# Patient Record
Sex: MALE | Race: ASIAN | NOT HISPANIC OR LATINO | Employment: OTHER | URBAN - METROPOLITAN AREA
[De-identification: names, ages, dates, MRNs, and addresses within clinical notes are randomized per-mention and may not be internally consistent; named-entity substitution may affect disease eponyms.]

---

## 2017-09-28 ENCOUNTER — APPOINTMENT (OUTPATIENT)
Dept: SOCIAL WORK | Facility: CLINIC | Age: 62
End: 2017-09-28

## 2017-09-28 PROCEDURE — 90686 IIV4 VACC NO PRSV 0.5 ML IM: CPT | Performed by: REGISTERED NURSE

## 2018-09-26 ENCOUNTER — IMMUNIZATION (OUTPATIENT)
Dept: OCCUPATIONAL MEDICINE | Facility: CLINIC | Age: 63
End: 2018-09-26

## 2018-09-26 DIAGNOSIS — Z23 NEED FOR VACCINATION: ICD-10-CM

## 2018-09-30 PROCEDURE — 90686 IIV4 VACC NO PRSV 0.5 ML IM: CPT | Performed by: PREVENTIVE MEDICINE

## 2019-02-28 ENCOUNTER — HOSPITAL ENCOUNTER (OUTPATIENT)
Dept: RADIOLOGY | Facility: MEDICAL CENTER | Age: 64
End: 2019-02-28
Attending: EMERGENCY MEDICINE
Payer: COMMERCIAL

## 2019-02-28 DIAGNOSIS — S83.412A TEAR OF MEDIAL COLLATERAL LIGAMENT OF LEFT KNEE, INITIAL ENCOUNTER: ICD-10-CM

## 2019-02-28 PROCEDURE — 73721 MRI JNT OF LWR EXTRE W/O DYE: CPT | Mod: LT

## 2019-09-23 ENCOUNTER — IMMUNIZATION (OUTPATIENT)
Dept: SOCIAL WORK | Facility: CLINIC | Age: 64
End: 2019-09-23

## 2019-09-23 DIAGNOSIS — Z23 NEED FOR VACCINATION: ICD-10-CM

## 2019-09-23 PROCEDURE — 90686 IIV4 VACC NO PRSV 0.5 ML IM: CPT | Performed by: REGISTERED NURSE

## 2020-03-13 ENCOUNTER — NON-PROVIDER VISIT (OUTPATIENT)
Dept: OCCUPATIONAL MEDICINE | Facility: CLINIC | Age: 65
End: 2020-03-13

## 2020-03-13 DIAGNOSIS — Z02.89 ENCOUNTER FOR OCCUPATIONAL HEALTH EXAMINATION: ICD-10-CM

## 2020-03-13 PROCEDURE — 94375 RESPIRATORY FLOW VOLUME LOOP: CPT | Performed by: FAMILY MEDICINE

## 2020-03-23 ENCOUNTER — EH NON-PROVIDER (OUTPATIENT)
Dept: OCCUPATIONAL MEDICINE | Facility: CLINIC | Age: 65
End: 2020-03-23
Payer: COMMERCIAL

## 2020-03-23 DIAGNOSIS — Z01.89 RESPIRATORY CLEARANCE EXAMINATION, ENCOUNTER FOR: ICD-10-CM

## 2020-07-07 ENCOUNTER — HOSPITAL ENCOUNTER (OUTPATIENT)
Facility: MEDICAL CENTER | Age: 65
End: 2020-07-07
Attending: NURSE PRACTITIONER
Payer: COMMERCIAL

## 2020-07-07 ENCOUNTER — NON-PROVIDER VISIT (OUTPATIENT)
Dept: OCCUPATIONAL MEDICINE | Facility: CLINIC | Age: 65
End: 2020-07-07

## 2020-07-07 DIAGNOSIS — Z11.59 ENCOUNTER FOR SCREENING FOR OTHER VIRAL DISEASES: ICD-10-CM

## 2020-07-07 LAB — COVID ORDER STATUS COVID19: NORMAL

## 2020-07-07 PROCEDURE — U0003 INFECTIOUS AGENT DETECTION BY NUCLEIC ACID (DNA OR RNA); SEVERE ACUTE RESPIRATORY SYNDROME CORONAVIRUS 2 (SARS-COV-2) (CORONAVIRUS DISEASE [COVID-19]), AMPLIFIED PROBE TECHNIQUE, MAKING USE OF HIGH THROUGHPUT TECHNOLOGIES AS DESCRIBED BY CMS-2020-01-R: HCPCS | Performed by: NURSE PRACTITIONER

## 2020-07-08 LAB
SARS-COV-2 RNA RESP QL NAA+PROBE: NOTDETECTED
SPECIMEN SOURCE: NORMAL

## 2020-07-09 ENCOUNTER — TELEPHONE (OUTPATIENT)
Dept: OCCUPATIONAL MEDICINE | Facility: CLINIC | Age: 65
End: 2020-07-09

## 2020-07-09 NOTE — TELEPHONE ENCOUNTER
Phone Number Called: [phone] (home)       Call outcome: Spoke to patient regarding message below.    Message: COVID results Negative and to reach out to his Infection Prevention Contact

## 2020-10-19 ENCOUNTER — NON-PROVIDER VISIT (OUTPATIENT)
Dept: OCCUPATIONAL MEDICINE | Facility: CLINIC | Age: 65
End: 2020-10-19

## 2020-10-19 DIAGNOSIS — Z23 NEED FOR VACCINATION: ICD-10-CM

## 2020-10-19 PROCEDURE — 90686 IIV4 VACC NO PRSV 0.5 ML IM: CPT | Performed by: NURSE PRACTITIONER

## 2020-12-16 DIAGNOSIS — Z23 NEED FOR VACCINATION: ICD-10-CM

## 2020-12-20 ENCOUNTER — APPOINTMENT (OUTPATIENT)
Dept: FAMILY PLANNING/WOMEN'S HEALTH CLINIC | Facility: IMMUNIZATION CENTER | Age: 65
End: 2020-12-20
Attending: FAMILY MEDICINE
Payer: COMMERCIAL

## 2020-12-20 DIAGNOSIS — Z23 NEED FOR VACCINATION: ICD-10-CM

## 2020-12-20 PROCEDURE — 0001A PFIZER SARS-COV-2 VACCINE: CPT

## 2020-12-20 PROCEDURE — 91300 PFIZER SARS-COV-2 VACCINE: CPT

## 2020-12-21 ENCOUNTER — IMMUNIZATION (OUTPATIENT)
Dept: FAMILY PLANNING/WOMEN'S HEALTH CLINIC | Facility: IMMUNIZATION CENTER | Age: 65
End: 2020-12-21
Payer: COMMERCIAL

## 2020-12-21 DIAGNOSIS — Z23 ENCOUNTER FOR VACCINATION: Primary | ICD-10-CM

## 2021-01-07 ENCOUNTER — HOSPITAL ENCOUNTER (OUTPATIENT)
Dept: LAB | Facility: MEDICAL CENTER | Age: 66
End: 2021-01-07
Attending: FAMILY MEDICINE
Payer: COMMERCIAL

## 2021-01-07 LAB
ALBUMIN SERPL BCP-MCNC: 4.2 G/DL (ref 3.2–4.9)
ALBUMIN/GLOB SERPL: 1.3 G/DL
ALP SERPL-CCNC: 115 U/L (ref 30–99)
ALT SERPL-CCNC: 20 U/L (ref 2–50)
ANION GAP SERPL CALC-SCNC: 9 MMOL/L (ref 7–16)
APPEARANCE UR: CLEAR
AST SERPL-CCNC: 15 U/L (ref 12–45)
BACTERIA #/AREA URNS HPF: NEGATIVE /HPF
BASOPHILS # BLD AUTO: 0.6 % (ref 0–1.8)
BASOPHILS # BLD: 0.04 K/UL (ref 0–0.12)
BILIRUB SERPL-MCNC: 0.7 MG/DL (ref 0.1–1.5)
BILIRUB UR QL STRIP.AUTO: NEGATIVE
BUN SERPL-MCNC: 15 MG/DL (ref 8–22)
CALCIUM SERPL-MCNC: 9.3 MG/DL (ref 8.5–10.5)
CHLORIDE SERPL-SCNC: 104 MMOL/L (ref 96–112)
CHOLEST SERPL-MCNC: 220 MG/DL (ref 100–199)
CK SERPL-CCNC: 51 U/L (ref 0–154)
CO2 SERPL-SCNC: 28 MMOL/L (ref 20–33)
COLOR UR: YELLOW
CREAT SERPL-MCNC: 0.9 MG/DL (ref 0.5–1.4)
CRP SERPL HS-MCNC: 1.8 MG/DL (ref 0–0.75)
EOSINOPHIL # BLD AUTO: 0.5 K/UL (ref 0–0.51)
EOSINOPHIL NFR BLD: 7.8 % (ref 0–6.9)
EPI CELLS #/AREA URNS HPF: NEGATIVE /HPF
ERYTHROCYTE [DISTWIDTH] IN BLOOD BY AUTOMATED COUNT: 45.7 FL (ref 35.9–50)
ERYTHROCYTE [SEDIMENTATION RATE] IN BLOOD BY WESTERGREN METHOD: 32 MM/HOUR (ref 0–20)
EST. AVERAGE GLUCOSE BLD GHB EST-MCNC: 140 MG/DL
FASTING STATUS PATIENT QL REPORTED: NORMAL
GLOBULIN SER CALC-MCNC: 3.2 G/DL (ref 1.9–3.5)
GLUCOSE SERPL-MCNC: 120 MG/DL (ref 65–99)
GLUCOSE UR STRIP.AUTO-MCNC: NEGATIVE MG/DL
HBA1C MFR BLD: 6.5 % (ref 0–5.6)
HCT VFR BLD AUTO: 45.2 % (ref 42–52)
HDLC SERPL-MCNC: 63 MG/DL
HGB BLD-MCNC: 14.6 G/DL (ref 14–18)
IMM GRANULOCYTES # BLD AUTO: 0.02 K/UL (ref 0–0.11)
IMM GRANULOCYTES NFR BLD AUTO: 0.3 % (ref 0–0.9)
KETONES UR STRIP.AUTO-MCNC: NEGATIVE MG/DL
LDLC SERPL CALC-MCNC: 138 MG/DL
LEUKOCYTE ESTERASE UR QL STRIP.AUTO: NEGATIVE
LYMPHOCYTES # BLD AUTO: 1.25 K/UL (ref 1–4.8)
LYMPHOCYTES NFR BLD: 19.6 % (ref 22–41)
MCH RBC QN AUTO: 30.8 PG (ref 27–33)
MCHC RBC AUTO-ENTMCNC: 32.3 G/DL (ref 33.7–35.3)
MCV RBC AUTO: 95.4 FL (ref 81.4–97.8)
MICRO URNS: ABNORMAL
MONOCYTES # BLD AUTO: 0.67 K/UL (ref 0–0.85)
MONOCYTES NFR BLD AUTO: 10.5 % (ref 0–13.4)
NEUTROPHILS # BLD AUTO: 3.89 K/UL (ref 1.82–7.42)
NEUTROPHILS NFR BLD: 61.2 % (ref 44–72)
NITRITE UR QL STRIP.AUTO: NEGATIVE
NRBC # BLD AUTO: 0 K/UL
NRBC BLD-RTO: 0 /100 WBC
PH UR STRIP.AUTO: 6.5 [PH] (ref 5–8)
PLATELET # BLD AUTO: 355 K/UL (ref 164–446)
PMV BLD AUTO: 8.6 FL (ref 9–12.9)
POTASSIUM SERPL-SCNC: 3.8 MMOL/L (ref 3.6–5.5)
PROT SERPL-MCNC: 7.4 G/DL (ref 6–8.2)
PROT UR QL STRIP: NEGATIVE MG/DL
PSA SERPL-MCNC: 0.77 NG/ML (ref 0–4)
RBC # BLD AUTO: 4.74 M/UL (ref 4.7–6.1)
RBC # URNS HPF: ABNORMAL /HPF
RBC UR QL AUTO: ABNORMAL
RHEUMATOID FACT SER IA-ACNC: <10 IU/ML (ref 0–14)
SODIUM SERPL-SCNC: 141 MMOL/L (ref 135–145)
SP GR UR STRIP.AUTO: 1.01
TRIGL SERPL-MCNC: 96 MG/DL (ref 0–149)
TSH SERPL DL<=0.005 MIU/L-ACNC: 11.9 UIU/ML (ref 0.38–5.33)
UROBILINOGEN UR STRIP.AUTO-MCNC: 0.2 MG/DL
WBC # BLD AUTO: 6.4 K/UL (ref 4.8–10.8)
WBC #/AREA URNS HPF: ABNORMAL /HPF

## 2021-01-07 PROCEDURE — 36415 COLL VENOUS BLD VENIPUNCTURE: CPT

## 2021-01-07 PROCEDURE — 86060 ANTISTREPTOLYSIN O TITER: CPT

## 2021-01-07 PROCEDURE — 86038 ANTINUCLEAR ANTIBODIES: CPT

## 2021-01-07 PROCEDURE — 85652 RBC SED RATE AUTOMATED: CPT

## 2021-01-07 PROCEDURE — 81001 URINALYSIS AUTO W/SCOPE: CPT

## 2021-01-07 PROCEDURE — 80061 LIPID PANEL: CPT

## 2021-01-07 PROCEDURE — 84443 ASSAY THYROID STIM HORMONE: CPT

## 2021-01-07 PROCEDURE — 86200 CCP ANTIBODY: CPT

## 2021-01-07 PROCEDURE — 82085 ASSAY OF ALDOLASE: CPT

## 2021-01-07 PROCEDURE — 80053 COMPREHEN METABOLIC PANEL: CPT

## 2021-01-07 PROCEDURE — 82550 ASSAY OF CK (CPK): CPT

## 2021-01-07 PROCEDURE — 86431 RHEUMATOID FACTOR QUANT: CPT

## 2021-01-07 PROCEDURE — 85025 COMPLETE CBC W/AUTO DIFF WBC: CPT

## 2021-01-07 PROCEDURE — 84153 ASSAY OF PSA TOTAL: CPT

## 2021-01-07 PROCEDURE — 86140 C-REACTIVE PROTEIN: CPT

## 2021-01-07 PROCEDURE — 86235 NUCLEAR ANTIGEN ANTIBODY: CPT | Mod: 91

## 2021-01-07 PROCEDURE — 83036 HEMOGLOBIN GLYCOSYLATED A1C: CPT

## 2021-01-07 PROCEDURE — 86618 LYME DISEASE ANTIBODY: CPT

## 2021-01-09 LAB
ALDOLASE SERPL-CCNC: 1.6 U/L (ref 1.5–8.1)
ASO AB SERPL-ACNC: <55 IU/ML (ref 0–330)
B BURGDOR AB SER IA-ACNC: 0.21 LIV (ref 0–1.2)
CCP IGG SERPL-ACNC: 4 UNITS (ref 0–19)
ENA SM IGG SER-ACNC: 2 AU/ML (ref 0–40)
ENA SS-B IGG SER IA-ACNC: 0 AU/ML (ref 0–40)
NUCLEAR IGG SER QL IA: NORMAL
SSA52 R0ENA AB IGG Q0420: 2 AU/ML (ref 0–40)
SSA60 R0ENA AB IGG Q0419: 0 AU/ML (ref 0–40)
U1 SNRNP IGG SER QL: 4 AU/ML (ref 0–40)

## 2021-01-11 ENCOUNTER — IMMUNIZATION (OUTPATIENT)
Dept: FAMILY PLANNING/WOMEN'S HEALTH CLINIC | Facility: IMMUNIZATION CENTER | Age: 66
End: 2021-01-11
Attending: FAMILY MEDICINE
Payer: COMMERCIAL

## 2021-01-11 DIAGNOSIS — Z23 ENCOUNTER FOR VACCINATION: Primary | ICD-10-CM

## 2021-01-11 PROCEDURE — 0002A PFIZER SARS-COV-2 VACCINE: CPT

## 2021-01-11 PROCEDURE — 91300 PFIZER SARS-COV-2 VACCINE: CPT

## 2021-04-22 ENCOUNTER — HOSPITAL ENCOUNTER (OUTPATIENT)
Dept: LAB | Facility: MEDICAL CENTER | Age: 66
End: 2021-04-22
Attending: INTERNAL MEDICINE
Payer: COMMERCIAL

## 2021-04-22 ENCOUNTER — HOSPITAL ENCOUNTER (OUTPATIENT)
Dept: LAB | Facility: MEDICAL CENTER | Age: 66
End: 2021-04-22
Attending: FAMILY MEDICINE
Payer: COMMERCIAL

## 2021-04-22 LAB
APPEARANCE UR: CLEAR
BILIRUB UR QL STRIP.AUTO: NEGATIVE
COLOR UR: YELLOW
CRP SERPL HS-MCNC: 1.71 MG/DL (ref 0–0.75)
ERYTHROCYTE [SEDIMENTATION RATE] IN BLOOD BY WESTERGREN METHOD: 13 MM/HOUR (ref 0–20)
EST. AVERAGE GLUCOSE BLD GHB EST-MCNC: 143 MG/DL
GLUCOSE UR STRIP.AUTO-MCNC: NEGATIVE MG/DL
HBA1C MFR BLD: 6.6 % (ref 4–5.6)
KETONES UR STRIP.AUTO-MCNC: NEGATIVE MG/DL
LEUKOCYTE ESTERASE UR QL STRIP.AUTO: NEGATIVE
MICRO URNS: ABNORMAL
NITRITE UR QL STRIP.AUTO: NEGATIVE
PH UR STRIP.AUTO: 6 [PH] (ref 5–8)
PROT UR QL STRIP: NEGATIVE MG/DL
RBC # URNS HPF: ABNORMAL /HPF
RBC UR QL AUTO: ABNORMAL
SP GR UR STRIP.AUTO: 1.02
TSH SERPL DL<=0.005 MIU/L-ACNC: 5.5 UIU/ML (ref 0.38–5.33)
UROBILINOGEN UR STRIP.AUTO-MCNC: 0.2 MG/DL
WBC #/AREA URNS HPF: ABNORMAL /HPF

## 2021-04-22 PROCEDURE — 85652 RBC SED RATE AUTOMATED: CPT | Mod: 91

## 2021-04-22 PROCEDURE — 85652 RBC SED RATE AUTOMATED: CPT

## 2021-04-22 PROCEDURE — 83036 HEMOGLOBIN GLYCOSYLATED A1C: CPT

## 2021-04-22 PROCEDURE — 81001 URINALYSIS AUTO W/SCOPE: CPT

## 2021-04-22 PROCEDURE — 36415 COLL VENOUS BLD VENIPUNCTURE: CPT

## 2021-04-22 PROCEDURE — 84443 ASSAY THYROID STIM HORMONE: CPT

## 2021-04-22 PROCEDURE — 86140 C-REACTIVE PROTEIN: CPT

## 2021-04-23 LAB — ERYTHROCYTE [SEDIMENTATION RATE] IN BLOOD BY WESTERGREN METHOD: 8 MM/HOUR (ref 0–20)

## 2021-05-25 ENCOUNTER — HOSPITAL ENCOUNTER (OUTPATIENT)
Facility: MEDICAL CENTER | Age: 66
End: 2021-05-25
Attending: PHYSICIAN ASSISTANT
Payer: COMMERCIAL

## 2021-05-25 LAB
APPEARANCE UR: CLEAR
BILIRUB UR QL STRIP.AUTO: NEGATIVE
COLOR UR: YELLOW
GLUCOSE UR STRIP.AUTO-MCNC: NEGATIVE MG/DL
KETONES UR STRIP.AUTO-MCNC: NEGATIVE MG/DL
LEUKOCYTE ESTERASE UR QL STRIP.AUTO: NEGATIVE
MICRO URNS: NORMAL
NITRITE UR QL STRIP.AUTO: NEGATIVE
PH UR STRIP.AUTO: 6 [PH] (ref 5–8)
PROT UR QL STRIP: NEGATIVE MG/DL
RBC UR QL AUTO: NEGATIVE
SP GR UR STRIP.AUTO: 1.01
UROBILINOGEN UR STRIP.AUTO-MCNC: 0.2 MG/DL

## 2021-05-25 PROCEDURE — 81003 URINALYSIS AUTO W/O SCOPE: CPT

## 2021-10-01 ENCOUNTER — IMMUNIZATION (OUTPATIENT)
Dept: OCCUPATIONAL MEDICINE | Facility: CLINIC | Age: 66
End: 2021-10-01
Payer: COMMERCIAL

## 2021-10-01 DIAGNOSIS — Z23 NEED FOR VACCINATION: Primary | ICD-10-CM

## 2021-10-01 PROCEDURE — 90686 IIV4 VACC NO PRSV 0.5 ML IM: CPT | Performed by: PREVENTIVE MEDICINE

## 2021-12-10 ENCOUNTER — HOSPITAL ENCOUNTER (OUTPATIENT)
Dept: LAB | Facility: MEDICAL CENTER | Age: 66
End: 2021-12-10
Attending: INTERNAL MEDICINE
Payer: COMMERCIAL

## 2021-12-10 ENCOUNTER — HOSPITAL ENCOUNTER (OUTPATIENT)
Dept: LAB | Facility: MEDICAL CENTER | Age: 66
End: 2021-12-10
Attending: OPHTHALMOLOGY
Payer: COMMERCIAL

## 2021-12-10 LAB
BUN SERPL-MCNC: 23 MG/DL (ref 8–22)
CREAT SERPL-MCNC: 1 MG/DL (ref 0.5–1.4)
CRP SERPL HS-MCNC: <0.3 MG/DL (ref 0–0.75)
ERYTHROCYTE [SEDIMENTATION RATE] IN BLOOD BY WESTERGREN METHOD: 7 MM/HOUR (ref 0–20)

## 2021-12-10 PROCEDURE — 82565 ASSAY OF CREATININE: CPT

## 2021-12-10 PROCEDURE — 84520 ASSAY OF UREA NITROGEN: CPT

## 2021-12-10 PROCEDURE — 36415 COLL VENOUS BLD VENIPUNCTURE: CPT

## 2021-12-10 PROCEDURE — 86140 C-REACTIVE PROTEIN: CPT

## 2021-12-10 PROCEDURE — 85652 RBC SED RATE AUTOMATED: CPT

## 2021-12-14 ENCOUNTER — TELEPHONE (OUTPATIENT)
Dept: HEALTH INFORMATION MANAGEMENT | Facility: OTHER | Age: 66
End: 2021-12-14

## 2021-12-16 ENCOUNTER — HOSPITAL ENCOUNTER (OUTPATIENT)
Dept: RADIOLOGY | Facility: MEDICAL CENTER | Age: 66
End: 2021-12-16
Attending: OPHTHALMOLOGY
Payer: COMMERCIAL

## 2021-12-16 DIAGNOSIS — E05.00 GRAVES' DISEASE: ICD-10-CM

## 2021-12-16 DIAGNOSIS — H53.452 PERIPHERAL VISUAL FIELD DEFECT, LEFT: ICD-10-CM

## 2021-12-16 DIAGNOSIS — H40.021 OPEN ANGLE WITH BORDERLINE FINDINGS AND HIGH GLAUCOMA RISK IN RIGHT EYE: ICD-10-CM

## 2021-12-16 DIAGNOSIS — H05.243 CONSTANT EXOPHTHALMOS OF BOTH EYES: ICD-10-CM

## 2021-12-16 DIAGNOSIS — H40.1223 NORMAL TENSION GLAUCOMA OF LEFT EYE, SEVERE STAGE: ICD-10-CM

## 2021-12-16 PROCEDURE — 70553 MRI BRAIN STEM W/O & W/DYE: CPT

## 2021-12-16 PROCEDURE — 70543 MRI ORBT/FAC/NCK W/O &W/DYE: CPT

## 2021-12-16 PROCEDURE — A9576 INJ PROHANCE MULTIPACK: HCPCS

## 2021-12-16 PROCEDURE — 700117 HCHG RX CONTRAST REV CODE 255

## 2021-12-16 RX ADMIN — GADOTERIDOL 13 ML: 279.3 INJECTION, SOLUTION INTRAVENOUS at 08:56

## 2022-06-14 ENCOUNTER — PATIENT MESSAGE (OUTPATIENT)
Dept: HEALTH INFORMATION MANAGEMENT | Facility: OTHER | Age: 67
End: 2022-06-14

## 2022-06-15 ENCOUNTER — TELEPHONE (OUTPATIENT)
Dept: HEALTH INFORMATION MANAGEMENT | Facility: OTHER | Age: 67
End: 2022-06-15

## 2022-08-02 NOTE — TELEPHONE ENCOUNTER
3rd attempt PCP Provider attribution & 3rd attempt COMPREHENSIVE HEALTH ASSESSMENT.     Please transfer to Member to Outreach Team at [phone] when patient returns call.

## 2022-11-04 ENCOUNTER — DOCUMENTATION (OUTPATIENT)
Dept: HEALTH INFORMATION MANAGEMENT | Facility: OTHER | Age: 67
End: 2022-11-04

## 2023-06-30 ENCOUNTER — HOSPITAL ENCOUNTER (OUTPATIENT)
Dept: LAB | Facility: MEDICAL CENTER | Age: 68
End: 2023-06-30
Attending: FAMILY MEDICINE
Payer: MEDICARE

## 2023-06-30 LAB
ALBUMIN SERPL BCP-MCNC: 4.1 G/DL (ref 3.2–4.9)
ALBUMIN/GLOB SERPL: 1.5 G/DL
ALP SERPL-CCNC: 71 U/L (ref 30–99)
ALT SERPL-CCNC: 13 U/L (ref 2–50)
ANION GAP SERPL CALC-SCNC: 11 MMOL/L (ref 7–16)
APPEARANCE UR: CLEAR
AST SERPL-CCNC: 13 U/L (ref 12–45)
BACTERIA #/AREA URNS HPF: NEGATIVE /HPF
BASOPHILS # BLD AUTO: 1 % (ref 0–1.8)
BASOPHILS # BLD: 0.05 K/UL (ref 0–0.12)
BILIRUB SERPL-MCNC: 0.9 MG/DL (ref 0.1–1.5)
BILIRUB UR QL STRIP.AUTO: NEGATIVE
BUN SERPL-MCNC: 19 MG/DL (ref 8–22)
CALCIUM ALBUM COR SERPL-MCNC: 9.1 MG/DL (ref 8.5–10.5)
CALCIUM SERPL-MCNC: 9.2 MG/DL (ref 8.5–10.5)
CHLORIDE SERPL-SCNC: 106 MMOL/L (ref 96–112)
CHOLEST SERPL-MCNC: 213 MG/DL (ref 100–199)
CK SERPL-CCNC: 103 U/L (ref 0–154)
CO2 SERPL-SCNC: 22 MMOL/L (ref 20–33)
COLOR UR: YELLOW
CREAT SERPL-MCNC: 1.06 MG/DL (ref 0.5–1.4)
CRP SERPL HS-MCNC: <0.3 MG/DL (ref 0–0.75)
EOSINOPHIL # BLD AUTO: 0.39 K/UL (ref 0–0.51)
EOSINOPHIL NFR BLD: 8 % (ref 0–6.9)
EPI CELLS #/AREA URNS HPF: NEGATIVE /HPF
ERYTHROCYTE [DISTWIDTH] IN BLOOD BY AUTOMATED COUNT: 48.5 FL (ref 35.9–50)
ERYTHROCYTE [SEDIMENTATION RATE] IN BLOOD BY WESTERGREN METHOD: 7 MM/HOUR (ref 0–20)
EST. AVERAGE GLUCOSE BLD GHB EST-MCNC: 123 MG/DL
GFR SERPLBLD CREATININE-BSD FMLA CKD-EPI: 77 ML/MIN/1.73 M 2
GLOBULIN SER CALC-MCNC: 2.7 G/DL (ref 1.9–3.5)
GLUCOSE SERPL-MCNC: 91 MG/DL (ref 65–99)
GLUCOSE UR STRIP.AUTO-MCNC: NEGATIVE MG/DL
HBA1C MFR BLD: 5.9 % (ref 4–5.6)
HCT VFR BLD AUTO: 43.7 % (ref 42–52)
HDLC SERPL-MCNC: 82 MG/DL
HGB BLD-MCNC: 14.4 G/DL (ref 14–18)
HYALINE CASTS #/AREA URNS LPF: ABNORMAL /LPF
IMM GRANULOCYTES # BLD AUTO: 0.01 K/UL (ref 0–0.11)
IMM GRANULOCYTES NFR BLD AUTO: 0.2 % (ref 0–0.9)
KETONES UR STRIP.AUTO-MCNC: ABNORMAL MG/DL
LDLC SERPL CALC-MCNC: 115 MG/DL
LEUKOCYTE ESTERASE UR QL STRIP.AUTO: NEGATIVE
LYMPHOCYTES # BLD AUTO: 1.3 K/UL (ref 1–4.8)
LYMPHOCYTES NFR BLD: 26.7 % (ref 22–41)
MCH RBC QN AUTO: 31.2 PG (ref 27–33)
MCHC RBC AUTO-ENTMCNC: 33 G/DL (ref 32.3–36.5)
MCV RBC AUTO: 94.6 FL (ref 81.4–97.8)
MICRO URNS: ABNORMAL
MONOCYTES # BLD AUTO: 0.4 K/UL (ref 0–0.85)
MONOCYTES NFR BLD AUTO: 8.2 % (ref 0–13.4)
NEUTROPHILS # BLD AUTO: 2.71 K/UL (ref 1.82–7.42)
NEUTROPHILS NFR BLD: 55.9 % (ref 44–72)
NITRITE UR QL STRIP.AUTO: NEGATIVE
NRBC # BLD AUTO: 0 K/UL
NRBC BLD-RTO: 0 /100 WBC (ref 0–0.2)
PH UR STRIP.AUTO: 5.5 [PH] (ref 5–8)
PLATELET # BLD AUTO: 270 K/UL (ref 164–446)
PMV BLD AUTO: 8.5 FL (ref 9–12.9)
POTASSIUM SERPL-SCNC: 4.1 MMOL/L (ref 3.6–5.5)
PROT SERPL-MCNC: 6.8 G/DL (ref 6–8.2)
PROT UR QL STRIP: NEGATIVE MG/DL
PSA SERPL-MCNC: 0.66 NG/ML (ref 0–4)
RBC # BLD AUTO: 4.62 M/UL (ref 4.7–6.1)
RBC # URNS HPF: ABNORMAL /HPF
RBC UR QL AUTO: ABNORMAL
SODIUM SERPL-SCNC: 139 MMOL/L (ref 135–145)
SP GR UR STRIP.AUTO: 1.02
TRIGL SERPL-MCNC: 80 MG/DL (ref 0–149)
TSH SERPL DL<=0.005 MIU/L-ACNC: 2.56 UIU/ML (ref 0.38–5.33)
URATE SERPL-MCNC: 7.3 MG/DL (ref 2.5–8.3)
UROBILINOGEN UR STRIP.AUTO-MCNC: 0.2 MG/DL
WBC # BLD AUTO: 4.9 K/UL (ref 4.8–10.8)
WBC #/AREA URNS HPF: ABNORMAL /HPF

## 2023-06-30 PROCEDURE — 84153 ASSAY OF PSA TOTAL: CPT

## 2023-06-30 PROCEDURE — 80061 LIPID PANEL: CPT

## 2023-06-30 PROCEDURE — 86140 C-REACTIVE PROTEIN: CPT

## 2023-06-30 PROCEDURE — 81001 URINALYSIS AUTO W/SCOPE: CPT

## 2023-06-30 PROCEDURE — 83036 HEMOGLOBIN GLYCOSYLATED A1C: CPT

## 2023-06-30 PROCEDURE — 85025 COMPLETE CBC W/AUTO DIFF WBC: CPT

## 2023-06-30 PROCEDURE — 36415 COLL VENOUS BLD VENIPUNCTURE: CPT

## 2023-06-30 PROCEDURE — 82550 ASSAY OF CK (CPK): CPT

## 2023-06-30 PROCEDURE — 84443 ASSAY THYROID STIM HORMONE: CPT

## 2023-06-30 PROCEDURE — 80053 COMPREHEN METABOLIC PANEL: CPT

## 2023-06-30 PROCEDURE — 84550 ASSAY OF BLOOD/URIC ACID: CPT

## 2023-06-30 PROCEDURE — 85652 RBC SED RATE AUTOMATED: CPT

## 2023-07-06 ENCOUNTER — OFFICE VISIT (OUTPATIENT)
Dept: CARDIOLOGY | Facility: MEDICAL CENTER | Age: 68
End: 2023-07-06
Attending: INTERNAL MEDICINE
Payer: MEDICARE

## 2023-07-06 VITALS
BODY MASS INDEX: 21.83 KG/M2 | OXYGEN SATURATION: 98 % | HEIGHT: 65 IN | DIASTOLIC BLOOD PRESSURE: 80 MMHG | RESPIRATION RATE: 14 BRPM | SYSTOLIC BLOOD PRESSURE: 122 MMHG | WEIGHT: 131 LBS | HEART RATE: 80 BPM

## 2023-07-06 DIAGNOSIS — R94.31 ABNORMAL ECG: ICD-10-CM

## 2023-07-06 DIAGNOSIS — R00.2 PALPITATIONS: ICD-10-CM

## 2023-07-06 DIAGNOSIS — I20.9 ANGINA PECTORIS (HCC): ICD-10-CM

## 2023-07-06 DIAGNOSIS — I10 ESSENTIAL HYPERTENSION: ICD-10-CM

## 2023-07-06 DIAGNOSIS — R07.89 OTHER CHEST PAIN: ICD-10-CM

## 2023-07-06 LAB — EKG IMPRESSION: NORMAL

## 2023-07-06 PROCEDURE — 3079F DIAST BP 80-89 MM HG: CPT | Performed by: INTERNAL MEDICINE

## 2023-07-06 PROCEDURE — 3074F SYST BP LT 130 MM HG: CPT | Performed by: INTERNAL MEDICINE

## 2023-07-06 PROCEDURE — 99212 OFFICE O/P EST SF 10 MIN: CPT | Performed by: INTERNAL MEDICINE

## 2023-07-06 PROCEDURE — 93010 ELECTROCARDIOGRAM REPORT: CPT | Performed by: INTERNAL MEDICINE

## 2023-07-06 PROCEDURE — 93005 ELECTROCARDIOGRAM TRACING: CPT | Performed by: INTERNAL MEDICINE

## 2023-07-06 PROCEDURE — 99204 OFFICE O/P NEW MOD 45 MIN: CPT | Mod: 25 | Performed by: INTERNAL MEDICINE

## 2023-07-06 RX ORDER — DORZOLAMIDE HYDROCHLORIDE AND TIMOLOL MALEATE 20; 5 MG/ML; MG/ML
SOLUTION/ DROPS OPHTHALMIC
COMMUNITY
Start: 2023-06-07

## 2023-07-06 RX ORDER — LATANOPROST 50 UG/ML
1 SOLUTION/ DROPS OPHTHALMIC NIGHTLY
COMMUNITY
End: 2024-03-07

## 2023-07-06 ASSESSMENT — ENCOUNTER SYMPTOMS
PALPITATIONS: 0
DYSPNEA ON EXERTION: 0
BLURRED VISION: 0
COUGH: 0
WEAKNESS: 0
FALLS: 0
SHORTNESS OF BREATH: 0
SLEEP DISTURBANCES DUE TO BREATHING: 0
BACK PAIN: 0
DIZZINESS: 0
DIARRHEA: 0
NEAR-SYNCOPE: 0
LIGHT-HEADEDNESS: 0
FEVER: 0
ORTHOPNEA: 0
DECREASED APPETITE: 0
EXCESSIVE DAYTIME SLEEPINESS: 0
DOUBLE VISION: 0
NIGHT SWEATS: 0
WHEEZING: 0
SYNCOPE: 0
PARESTHESIAS: 0
FLANK PAIN: 0
DIAPHORESIS: 0
CONSTIPATION: 0
PND: 0
LOSS OF BALANCE: 0
VOMITING: 0
MYALGIAS: 0
BLOATING: 0
NAUSEA: 0
SORE THROAT: 0
IRREGULAR HEARTBEAT: 0
HEADACHES: 0
NUMBNESS: 0

## 2023-07-06 ASSESSMENT — FIBROSIS 4 INDEX: FIB4 SCORE: 0.88

## 2023-07-06 NOTE — PROGRESS NOTES
Cardiology Initial Consultation Note    Date of note:    7/6/2023    Primary Care Provider: Bobby Kirk M.D.  Referring Provider: Bobby Kirk M.D.     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    Patient states that he was in his usual state of health until few months ago when he started noticing exertional chest pain/pressure, upper back pain and dyspnea.  Symptoms have progressed over the last few months.  Referred to cardiology for further evaluation.  Was skiing in May when he had to stop and rest due to ongoing symptoms.  No prior cardiac issues.    In terms of physical activity, he is active.  Has to stop and rest at times due to exertional symptoms which is new for him.    Cardiovascular Risk Factors:  1. Smoking status: Never smoker  2. Type II Diabetes Mellitus: Prediabetes   Lab Results   Component Value Date/Time    HBA1C 5.9 (H) 06/30/2023 07:26 AM    HBA1C 6.6 (H) 04/22/2021 06:36 AM     3. Hypertension: Yes  4. Dyslipidemia: Yes   Cholesterol,Tot   Date Value Ref Range Status   06/30/2023 213 (H) 100 - 199 mg/dL Final     LDL   Date Value Ref Range Status   06/30/2023 115 (H) <100 mg/dL Final     HDL   Date Value Ref Range Status   06/30/2023 82 >=40 mg/dL Final     Triglycerides   Date Value Ref Range Status   06/30/2023 80 0 - 149 mg/dL Final     5. Family history of early Coronary Artery Disease in a first degree relative (Male less than 55 years of age; Female less than 65 years of age): Denies  6.  Obesity and/or Metabolic Syndrome: No  7. Sedentary lifestyle: No    Review of Systems   Constitutional: Negative for decreased appetite, diaphoresis,  fever, malaise/fatigue and night sweats.   HENT:  Negative for congestion and sore throat.    Eyes:  Negative for blurred vision and double vision.   Cardiovascular:  Positive for chest pain. Negative for cyanosis, dyspnea on exertion, irregular heartbeat, leg swelling, near-syncope, orthopnea, palpitations, paroxysmal nocturnal dyspnea and syncope.   Respiratory:  Negative for cough, shortness of breath, sleep disturbances due to breathing and wheezing.    Endocrine: Negative for cold intolerance and heat intolerance.   Musculoskeletal:  Positive for joint pain. Negative for back pain, falls and myalgias.   Gastrointestinal:  Negative for bloating, constipation, diarrhea, nausea and vomiting.   Genitourinary:  Negative for dysuria and flank pain.   Neurological:  Negative for excessive daytime sleepiness, dizziness, headaches, light-headedness, loss of balance, numbness, paresthesias and weakness.       Past Medical History:   Diagnosis Date    Graves disease     Hypertension          Past Surgical History:   Procedure Laterality Date    OTHER ORTHOPEDIC SURGERY      right shoulder surgery         Current Outpatient Medications   Medication Sig Dispense Refill    dorzolamide-timolol (COSOPT) 22.3-6.8 MG/ML Solution INSTILL 1 DROP INTO LEFT EYE TWICE DAILY      latanoprost (XALATAN) 0.005 % Solution Administer 1 Drop into both eyes every evening.      amlodipine (NORVASC) 5 MG TABS Take 5 mg by mouth every day.      levothyroxine (SYNTHROID) 50 MCG TABS Take 50 mcg by mouth every day.      ondansetron (ZOFRAN ODT) 4 MG TBDP Take 1 Tab by mouth every 8 hours as needed for Nausea/Vomiting. (Patient not taking: Reported on 7/6/2023) 6 Each 0    tamsulosin (FLOMAX) 0.4 MG capsule Take 1 Cap by mouth ONE-HALF HOUR AFTER BREAKFAST. (Patient not taking: Reported on 7/6/2023) 30 Cap 0    ketorolac (TORADOL) 10 MG TABS Take 1 Tab by mouth every four hours as needed for Mild Pain. (Patient not taking: Reported on 7/6/2023) 30  "Tab 3    oxycodone-acetaminophen (PERCOCET) 5-325 MG TABS Take 1-2 Tabs by mouth every four hours as needed (pain). (Patient not taking: Reported on 7/6/2023) 20 Each 0     No current facility-administered medications for this visit.         No Known Allergies      History reviewed. No pertinent family history.      Social History     Socioeconomic History    Marital status: Single     Spouse name: Not on file    Number of children: Not on file    Years of education: Not on file    Highest education level: Not on file   Occupational History    Not on file   Tobacco Use    Smoking status: Never    Smokeless tobacco: Never   Substance and Sexual Activity    Alcohol use: Never    Drug use: Never    Sexual activity: Not on file   Other Topics Concern    Not on file   Social History Narrative    Not on file     Social Determinants of Health     Financial Resource Strain: Not on file   Food Insecurity: Not on file   Transportation Needs: Not on file   Physical Activity: Not on file   Stress: Not on file   Social Connections: Not on file   Intimate Partner Violence: Not on file   Housing Stability: Not on file         Physical Exam:  Ambulatory Vitals  /80 (BP Location: Left arm, Patient Position: Sitting, BP Cuff Size: Adult)   Pulse 80   Resp 14   Ht 1.651 m (5' 5\")   Wt 59.4 kg (131 lb)   SpO2 98%    Oxygen Therapy:  Pulse Oximetry: 98 %  BP Readings from Last 4 Encounters:   07/06/23 122/80   03/04/14 (!) 169/106       Weight/BMI: Body mass index is 21.8 kg/m².  Wt Readings from Last 4 Encounters:   07/06/23 59.4 kg (131 lb)   03/04/14 63.5 kg (140 lb)       General: Well appearing and in no apparent distress  Eyes: nl conjunctiva, no icteric sclera  ENT: normal external appearance of ears  Neck: no visible JVP,  no carotid bruits  Lungs: normal respiratory effort, CTAB  Heart: RRR, no murmurs, no rubs or gallops, no edema bilateral lower extremities.   Abdomen: soft, non tender, non " distended  Extremities/MSK: no clubbing, no cyanosis  Neurological: No focal sensory deficits  Psychiatric: Appropriate affect, A/O x 3, intact judgement and insight  Skin: Warm extremities      Lab Data Review:  Lab Results   Component Value Date/Time    CHOLSTRLTOT 213 (H) 06/30/2023 07:26 AM     (H) 06/30/2023 07:26 AM    HDL 82 06/30/2023 07:26 AM    TRIGLYCERIDE 80 06/30/2023 07:26 AM       Lab Results   Component Value Date/Time    SODIUM 139 06/30/2023 07:26 AM    POTASSIUM 4.1 06/30/2023 07:26 AM    CHLORIDE 106 06/30/2023 07:26 AM    CO2 22 06/30/2023 07:26 AM    GLUCOSE 91 06/30/2023 07:26 AM    BUN 19 06/30/2023 07:26 AM    CREATININE 1.06 06/30/2023 07:26 AM     Lab Results   Component Value Date/Time    ALKPHOSPHAT 71 06/30/2023 07:26 AM    ASTSGOT 13 06/30/2023 07:26 AM    ALTSGPT 13 06/30/2023 07:26 AM    TBILIRUBIN 0.9 06/30/2023 07:26 AM      Lab Results   Component Value Date/Time    WBC 4.9 06/30/2023 07:26 AM     Lab Results   Component Value Date/Time    HBA1C 5.9 (H) 06/30/2023 07:26 AM    HBA1C 6.6 (H) 04/22/2021 06:36 AM         Cardiac Imaging and Procedures Review:    EKG dated 7/6/2023: My personal interpretation is sinus rhythm with inferior T-wave inversions      Radiology test Review:  MRI Brain (12/16/2021):   MRI of the brain without and with contrast within normal limits.      Assessment & Plan     1. Palpitations  EKG      2. Other chest pain  [id number] PET W/FLOW RESERVE      3. Essential hypertension        4. Abnormal ECG  [id number] PET W/FLOW RESERVE      5. Angina pectoris (HCC)  [id number] PET W/FLOW RESERVE            Shared Medical Decision Making:  We discussed his ongoing symptoms in extensive detail.  Symptoms are concerning for angina.  EKG obtained today shows inferior T wave inversions which are new compared to his prior ECG from 2014.  Due to abnormal ECG, not a candidate for exercise treadmill stress test.    Obtain nuclear cardiac PET scan  to evaluate for obstructive CAD.  We discussed proceeding with coronary angiogram and initiate antianginal therapy if stress test is positive to which he is in agreement with.    Discussed doing coronary calcium score for cardiovascular risk assessment which he would like to defer for now.    I have independently interpreted test results and discussed results and management with the patient.    All of patient's excellent questions were answered to the best of my knowledge and to his satisfaction.  It was a pleasure seeing Mr. Yimi Norwood in my clinic today. RTC if abnormal test results otherwise as needed. Patient is aware to call the cardiology clinic with any questions or concerns.      Adam Sanchez MD  [facility]  [facility], [address]  [address] [address]  Phone: [phone]  Fax: [phone]    Please note that this dictation was created using voice recognition software. I have made every reasonable attempt to correct obvious errors, but it is possible there are errors of grammar and possibly content that I did not discover before finalizing the note.

## 2023-07-26 ENCOUNTER — HOSPITAL ENCOUNTER (OUTPATIENT)
Dept: RADIOLOGY | Facility: MEDICAL CENTER | Age: 68
End: 2023-07-26
Attending: INTERNAL MEDICINE
Payer: MEDICARE

## 2023-07-26 DIAGNOSIS — R07.89 OTHER CHEST PAIN: ICD-10-CM

## 2023-07-26 DIAGNOSIS — R94.31 ABNORMAL ECG: ICD-10-CM

## 2023-07-26 DIAGNOSIS — I20.9 ANGINA PECTORIS (HCC): ICD-10-CM

## 2023-07-26 PROCEDURE — 78431 MYOCRD IMG PET RST&STRS CT: CPT

## 2023-07-26 PROCEDURE — 78434 AQMBF PET REST & RX STRESS: CPT | Mod: 26 | Performed by: INTERNAL MEDICINE

## 2023-07-26 PROCEDURE — 78431 MYOCRD IMG PET RST&STRS CT: CPT | Mod: 26 | Performed by: INTERNAL MEDICINE

## 2023-07-27 ENCOUNTER — TELEPHONE (OUTPATIENT)
Dept: CARDIOLOGY | Facility: MEDICAL CENTER | Age: 68
End: 2023-07-27
Payer: MEDICARE

## 2023-07-27 DIAGNOSIS — R94.39 ABNORMAL STRESS TEST: ICD-10-CM

## 2023-07-27 DIAGNOSIS — I10 ESSENTIAL HYPERTENSION: ICD-10-CM

## 2023-07-27 PROCEDURE — 93018 CV STRESS TEST I&R ONLY: CPT | Performed by: INTERNAL MEDICINE

## 2023-07-27 NOTE — TELEPHONE ENCOUNTER
CTA and lab order placed per DA  LRD form completed and faxed to [phone]  Confirmation status received  Scan to [facility]

## 2023-07-27 NOTE — TELEPHONE ENCOUNTER
[facility]            Caller: Yimi Norwood      Topic/issue: The patient was returning our call about the results of his recent stress test and possible blockages that were detected and he was asking for a call back      Callback Number: [phone]      Thank you    -Tom VEGA

## 2023-07-27 NOTE — TELEPHONE ENCOUNTER
Called pt [phone]  No answer, left DETAILED VM stating CTA order has been placed. Please call [phone] for scheduling. BMP lab has been placed per DA. Please complete this lab prior to CTA. Questions? Can call [phone]

## 2023-07-27 NOTE — TELEPHONE ENCOUNTER
Spoke with the patient regarding results.  Can you please order coronary CTA with calcium score?  May need a BMP before CT scan.  Thanks.

## 2023-07-31 DIAGNOSIS — Z01.812 PRE-PROCEDURE LAB EXAM: ICD-10-CM

## 2023-07-31 NOTE — PROGRESS NOTES
Request received to review order/ protocol for coronary CTA. Scan approved. Upon review of available medical records, the following orders have been placed:    Order placed for outpatient, non fasting creatinine blood draw to check kidney function within 30 days prior to contrast administration for coronary CTA if determined medically necessary by CT staff. Instructions for timing of blood test to be given by scheduling/ CT facility team.    This CT study may be scheduled through [facility] Scheduling at , option 2.

## 2023-10-17 ENCOUNTER — DOCUMENTATION (OUTPATIENT)
Dept: HEALTH INFORMATION MANAGEMENT | Facility: OTHER | Age: 68
End: 2023-10-17
Payer: MEDICARE

## 2024-02-21 ENCOUNTER — HOSPITAL ENCOUNTER (OUTPATIENT)
Dept: LAB | Facility: MEDICAL CENTER | Age: 69
End: 2024-02-21
Attending: INTERNAL MEDICINE
Payer: MEDICARE

## 2024-02-21 DIAGNOSIS — I10 ESSENTIAL HYPERTENSION: ICD-10-CM

## 2024-02-21 DIAGNOSIS — R94.39 ABNORMAL STRESS TEST: ICD-10-CM

## 2024-02-21 LAB
ANION GAP SERPL CALC-SCNC: 12 MMOL/L (ref 7–16)
BUN SERPL-MCNC: 15 MG/DL (ref 8–22)
CALCIUM SERPL-MCNC: 9.3 MG/DL (ref 8.5–10.5)
CHLORIDE SERPL-SCNC: 103 MMOL/L (ref 96–112)
CO2 SERPL-SCNC: 23 MMOL/L (ref 20–33)
CREAT SERPL-MCNC: 0.89 MG/DL (ref 0.5–1.4)
GFR SERPLBLD CREATININE-BSD FMLA CKD-EPI: 94 ML/MIN/1.73 M 2
GLUCOSE SERPL-MCNC: 92 MG/DL (ref 65–99)
POTASSIUM SERPL-SCNC: 4.3 MMOL/L (ref 3.6–5.5)
SODIUM SERPL-SCNC: 138 MMOL/L (ref 135–145)

## 2024-02-21 PROCEDURE — 80048 BASIC METABOLIC PNL TOTAL CA: CPT

## 2024-02-21 PROCEDURE — 36415 COLL VENOUS BLD VENIPUNCTURE: CPT

## 2024-03-04 ENCOUNTER — TELEPHONE (OUTPATIENT)
Dept: CARDIOLOGY | Facility: MEDICAL CENTER | Age: 69
End: 2024-03-04
Payer: MEDICARE

## 2024-03-04 ENCOUNTER — HOSPITAL ENCOUNTER (OUTPATIENT)
Dept: RADIOLOGY | Facility: MEDICAL CENTER | Age: 69
End: 2024-03-04
Attending: INTERNAL MEDICINE
Payer: MEDICARE

## 2024-03-04 DIAGNOSIS — R94.39 ABNORMAL STRESS TEST: ICD-10-CM

## 2024-03-04 PROCEDURE — 700102 HCHG RX REV CODE 250 W/ 637 OVERRIDE(OP): Performed by: RADIOLOGY

## 2024-03-04 PROCEDURE — 700117 HCHG RX CONTRAST REV CODE 255: Performed by: INTERNAL MEDICINE

## 2024-03-04 PROCEDURE — A9270 NON-COVERED ITEM OR SERVICE: HCPCS | Performed by: RADIOLOGY

## 2024-03-04 PROCEDURE — 75574 CT ANGIO HRT W/3D IMAGE: CPT

## 2024-03-04 RX ORDER — NITROGLYCERIN 0.4 MG/1
0.4 TABLET SUBLINGUAL
Status: DISCONTINUED | OUTPATIENT
Start: 2024-03-04 | End: 2024-03-05 | Stop reason: HOSPADM

## 2024-03-04 RX ADMIN — NITROGLYCERIN 0.4 MG: 0.4 TABLET, ORALLY DISINTEGRATING SUBLINGUAL at 08:34

## 2024-03-04 RX ADMIN — IOHEXOL 70 ML: 350 INJECTION, SOLUTION INTRAVENOUS at 08:52

## 2024-03-04 NOTE — PROGRESS NOTES
CTA EXAMINATION:  Patient had CTA.  Patient brought to preparation room.   Verbal consent given by patient for PIV and administration of NTG medications by RN.  PIV initiated, 20G R AC    Review of medications, protocol, and NPO status.   Education provided to patient regarding examination.  Patient given baseline 3 lead EK  HR: SR 80  BP: 153/106  RR 18   96% RA     Patient ready for CT scan  Vitals: 0825  BP:188/100  HR: SR 84  RR: 18  96% RA    Administration of Sublingual NTG given per CTA protocol at 0834--See MAR  Vitals: 0834  BP:142/75  HR: SR 80  RR: 18  98% RA    Vitals: 0845  BP:113/68  HR: SR 82  RR: 20  95% RA    Patient returned to preparation area where post procedure education given. PIV removed, patient provided 1 large bottle of water.     Vitals returned to baseline. Patient stated ready to go back. Discharge education provided. Discharged per protocol.     Patient ambulated self, escorted by RN to [facility].

## 2024-03-04 NOTE — TELEPHONE ENCOUNTER
ADD (Dr. Sanchez)    Caller: Paula with Radiology for  Dr. Mone Cole Office/ Speciality: Radiology    Calling about: An order was placed by [facility] and this Dr wants a Cardiologist to read the CTA  (Paula Will be calling every 15 min. Until the Dr speaks with someone)    Callback Number (Personal numbers in routing comments): see comments    Thank You    Qiana CHAN

## 2024-03-04 NOTE — TELEPHONE ENCOUNTER
----- Message from Elsy Leahy M.D. sent at 3/4/2024  3:16 PM PST -----  Can you bring this patient to see me ASAP (this week) due to abnormal CT scan?     ,Elsy Leahy M.D.

## 2024-03-05 NOTE — TELEPHONE ENCOUNTER
ADD (DA)    Caller: Yimi Norwood    Topic/issue: Patient returning RN Evelin's call.    Callback Number: [phone]     Thank you,  Millie OMER

## 2024-03-05 NOTE — TELEPHONE ENCOUNTER
Scheduled appointment for patient to come in Thursday @ 0830 to discuss CT results with TT.    Patient states he would like to establish with JI as his primary.  JI- can patient establish with you as his primary?

## 2024-03-06 NOTE — TELEPHONE ENCOUNTER
Called and spoke with patient.    Scheduled patient for 3/22 at [facility] with [facility].  Advised patient keep his apt with TT to go over CT results.    Patient verbalized understanding.

## 2024-03-06 NOTE — TELEPHONE ENCOUNTER
[place] M.D.  YouYesterday (6:51 AM)       Absolutely. Please see if he could come on Friday 03/22 at 12:40 at [facility], or he may be added on Monday or Tuesday clinic at the end of day if needed. Thank you.  YESICA

## 2024-03-07 ENCOUNTER — OFFICE VISIT (OUTPATIENT)
Dept: CARDIOLOGY | Facility: MEDICAL CENTER | Age: 69
End: 2024-03-07
Attending: INTERNAL MEDICINE
Payer: MEDICARE

## 2024-03-07 VITALS
OXYGEN SATURATION: 99 % | HEART RATE: 78 BPM | BODY MASS INDEX: 22.49 KG/M2 | SYSTOLIC BLOOD PRESSURE: 132 MMHG | DIASTOLIC BLOOD PRESSURE: 80 MMHG | WEIGHT: 135 LBS | RESPIRATION RATE: 16 BRPM | HEIGHT: 65 IN

## 2024-03-07 DIAGNOSIS — E11.65 TYPE 2 DIABETES MELLITUS WITH HYPERGLYCEMIA, WITHOUT LONG-TERM CURRENT USE OF INSULIN (HCC): ICD-10-CM

## 2024-03-07 DIAGNOSIS — I25.10 CORONARY ARTERY DISEASE INVOLVING NATIVE CORONARY ARTERY OF NATIVE HEART WITHOUT ANGINA PECTORIS: ICD-10-CM

## 2024-03-07 DIAGNOSIS — I10 HTN (HYPERTENSION), MALIGNANT: ICD-10-CM

## 2024-03-07 DIAGNOSIS — I20.9 ANGINA PECTORIS, UNSPECIFIED (HCC): ICD-10-CM

## 2024-03-07 DIAGNOSIS — E78.2 MIXED HYPERLIPIDEMIA: ICD-10-CM

## 2024-03-07 PROCEDURE — 3075F SYST BP GE 130 - 139MM HG: CPT | Performed by: INTERNAL MEDICINE

## 2024-03-07 PROCEDURE — RXMED WILLOW AMBULATORY MEDICATION CHARGE: Performed by: INTERNAL MEDICINE

## 2024-03-07 PROCEDURE — 99212 OFFICE O/P EST SF 10 MIN: CPT | Performed by: INTERNAL MEDICINE

## 2024-03-07 PROCEDURE — 3079F DIAST BP 80-89 MM HG: CPT | Performed by: INTERNAL MEDICINE

## 2024-03-07 PROCEDURE — 99214 OFFICE O/P EST MOD 30 MIN: CPT | Performed by: INTERNAL MEDICINE

## 2024-03-07 RX ORDER — ASPIRIN 81 MG/1
81 TABLET ORAL DAILY
Qty: 100 TABLET | Refills: 4 | Status: SHIPPED | OUTPATIENT
Start: 2024-03-07

## 2024-03-07 RX ORDER — ATORVASTATIN CALCIUM 80 MG/1
80 TABLET, FILM COATED ORAL NIGHTLY
Qty: 100 TABLET | Refills: 4 | Status: SHIPPED | OUTPATIENT
Start: 2024-03-07

## 2024-03-07 RX ORDER — NETARSUDIL AND LATANOPROST OPHTHALMIC SOLUTION, 0.02%/0.005% .2; .05 MG/ML; MG/ML
1 SOLUTION/ DROPS OPHTHALMIC; TOPICAL DAILY
COMMUNITY
Start: 2002-01-01

## 2024-03-07 RX ORDER — NEBIVOLOL 10 MG/1
10 TABLET ORAL DAILY
Qty: 100 TABLET | Refills: 4 | Status: SHIPPED | OUTPATIENT
Start: 2024-03-07

## 2024-03-07 RX ORDER — EMPAGLIFLOZIN 10 MG/1
10 TABLET, FILM COATED ORAL DAILY
Qty: 100 TABLET | Refills: 4 | Status: SHIPPED | OUTPATIENT
Start: 2024-03-07

## 2024-03-07 RX ORDER — VALSARTAN 40 MG/1
40 TABLET ORAL DAILY
Qty: 90 TABLET | Refills: 4 | Status: SHIPPED | OUTPATIENT
Start: 2024-03-07

## 2024-03-07 ASSESSMENT — ENCOUNTER SYMPTOMS
COUGH: 0
ABDOMINAL PAIN: 0
BRUISES/BLEEDS EASILY: 0
BLOOD IN STOOL: 0
FALLS: 0
SHORTNESS OF BREATH: 0
LOSS OF CONSCIOUSNESS: 0
HEADACHES: 0
PND: 0
NAUSEA: 0
SPEECH CHANGE: 0
DEPRESSION: 0
EYE PAIN: 0
WEIGHT LOSS: 0
FEVER: 0
PALPITATIONS: 0
SENSORY CHANGE: 0
MYALGIAS: 0
HALLUCINATIONS: 0
BLURRED VISION: 0
CLAUDICATION: 0
ORTHOPNEA: 0
CHILLS: 0
DOUBLE VISION: 0
DIZZINESS: 0
EYE DISCHARGE: 0
VOMITING: 0

## 2024-03-07 ASSESSMENT — FIBROSIS 4 INDEX: FIB4 SCORE: 0.89

## 2024-03-07 NOTE — PROGRESS NOTES
Chief Complaint   Patient presents with    Palpitations       Subjective     Yimi Norwood is a 68 y.o. male who presents today for cardiac care and evaluation due to recent coronary CT angiogram which showed evidence of obstructive disease in right coronary artery.  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  He was also experiencing some back pain and chest pressure sensation with skiing at the time.  However, since then, patient has been feeling much better no symptoms at all.  He has been skiing recently without any issues of chest pain shortness of breath.  He feels well overall.    I did personally interpreted the images of his coronary CT angiogram which show obstructive disease in his RCA with possible subtotal occlusion.  It is a right dominant circulation.  There was no obstructive disease seen in the left main and LAD.    I have personally interpreted EKG today with patient, there is no evidence of acute coronary syndrome, no evidence of prior infarct, normal MS and QT interval, no significant conduction disease. Sinus rhythm.    I have independently interpreted and reviewed blood tests results with patient in clinic which shows LDL level of 115, triglycerides level of 80, GFR of 94, K of 4.3. HgBa1c of 5.9.        Past Medical History:   Diagnosis Date    Graves disease     Hypertension      Past Surgical History:   Procedure Laterality Date    OTHER ORTHOPEDIC SURGERY      right shoulder surgery     History reviewed. No pertinent family history.  Social History     Socioeconomic History    Marital status: Single     Spouse name: Not on file    Number of children: Not on file    Years of education: Not on file    Highest education level: Not on file   Occupational History    Not on file   Tobacco Use    Smoking status: Never    Smokeless tobacco: Never   Substance and Sexual Activity    Alcohol use: Never    Drug use:  Never    Sexual activity: Not on file   Other Topics Concern    Not on file   Social History Narrative    Not on file     Social Determinants of Health     Financial Resource Strain: Not on file   Food Insecurity: Not on file   Transportation Needs: Not on file   Physical Activity: Not on file   Stress: Not on file   Social Connections: Not on file   Intimate Partner Violence: Not on file   Housing Stability: Not on file     No Known Allergies  Outpatient Encounter Medications as of 3/7/2024   Medication Sig Dispense Refill    ROCKLATAN 0.02-0.005 % Solution 1 Drop every day.      nebivolol (BYSTOLIC) 10 MG Tab Take 1 Tablet by mouth every day. 100 Tablet 4    valsartan (DIOVAN) 40 MG Tab Take 1 Tablet by mouth every day. 90 Tablet 4    aspirin 81 MG EC tablet Take 1 Tablet by mouth every day. 100 Tablet 4    Empagliflozin (JARDIANCE) 10 MG Tab tablet Take 1 Tablet by mouth every day. 90 Tablet 4    atorvastatin (LIPITOR) 80 MG tablet Take 1 Tablet by mouth every evening. 100 Tablet 4    dorzolamide-timolol (COSOPT) 22.3-6.8 MG/ML Solution INSTILL 1 DROP INTO LEFT EYE TWICE DAILY      levothyroxine (SYNTHROID) 50 MCG TABS Take 50 mcg by mouth every day.      [DISCONTINUED] latanoprost (XALATAN) 0.005 % Solution Administer 1 Drop into both eyes every evening.      [DISCONTINUED] amlodipine (NORVASC) 5 MG TABS Take 5 mg by mouth every day.      [DISCONTINUED] oxycodone-acetaminophen (PERCOCET) 5-325 MG TABS Take 1-2 Tabs by mouth every four hours as needed (pain). (Patient not taking: Reported on 7/6/2023) 20 Each 0     No facility-administered encounter medications on file as of 3/7/2024.     Review of Systems   Constitutional:  Negative for chills, fever, malaise/fatigue and weight loss.   HENT:  Negative for ear discharge, ear pain, hearing loss and nosebleeds.    Eyes:  Negative for blurred vision, double vision, pain and discharge.   Respiratory:  Negative for cough and shortness of breath.    Cardiovascular:   "Negative for chest pain, palpitations, orthopnea, claudication, leg swelling and PND.   Gastrointestinal:  Negative for abdominal pain, blood in stool, melena, nausea and vomiting.   Genitourinary:  Negative for dysuria and hematuria.   Musculoskeletal:  Negative for falls, joint pain and myalgias.   Skin:  Negative for itching and rash.   Neurological:  Negative for dizziness, sensory change, speech change, loss of consciousness and headaches.   Endo/Heme/Allergies:  Negative for environmental allergies. Does not bruise/bleed easily.   Psychiatric/Behavioral:  Negative for depression, hallucinations and suicidal ideas.               Objective     /80 (BP Location: Left arm, Patient Position: Sitting, BP Cuff Size: Adult)   Pulse 78   Resp 16   Ht 1.651 m (5' 5\")   Wt 61.2 kg (135 lb)   SpO2 99%   BMI 22.47 kg/m²     Physical Exam  Vitals and nursing note reviewed.   Constitutional:       General: He is not in acute distress.     Appearance: He is not diaphoretic.   HENT:      Head: Normocephalic and atraumatic.      Right Ear: External ear normal.      Left Ear: External ear normal.      Nose: No congestion or rhinorrhea.   Eyes:      General:         Right eye: No discharge.         Left eye: No discharge.   Neck:      Thyroid: No thyromegaly.      Vascular: No JVD.   Cardiovascular:      Rate and Rhythm: Normal rate and regular rhythm.      Pulses: Normal pulses.   Pulmonary:      Effort: No respiratory distress.   Abdominal:      General: There is no distension.      Tenderness: There is no abdominal tenderness.   Musculoskeletal:         General: No swelling or tenderness.      Right lower leg: No edema.      Left lower leg: No edema.   Skin:     General: Skin is warm and dry.   Neurological:      Mental Status: He is alert and oriented to person, place, and time.      Cranial Nerves: No cranial nerve deficit.   Psychiatric:         Behavior: Behavior normal.              Assessment & Plan     1. " Coronary artery disease involving native coronary artery of native heart without angina pectoris  nebivolol (BYSTOLIC) 10 MG Tab    valsartan (DIOVAN) 40 MG Tab    EC-ECHOCARDIOGRAM COMPLETE W/O CONT    aspirin 81 MG EC tablet    Empagliflozin (JARDIANCE) 10 MG Tab tablet    Basic Metabolic Panel    LIPID PANEL    Lipoprotein (a)      2. Angina pectoris, unspecified (HCC)  EC-ECHOCARDIOGRAM COMPLETE W/O CONT    Basic Metabolic Panel    LIPID PANEL    Lipoprotein (a)      3. HTN (hypertension), malignant  EC-ECHOCARDIOGRAM COMPLETE W/O CONT      4. Type 2 diabetes mellitus with hyperglycemia, without long-term current use of insulin (HCC)  Empagliflozin (JARDIANCE) 10 MG Tab tablet    Basic Metabolic Panel    LIPID PANEL    Lipoprotein (a)    HEMOGLOBIN A1C      5. Mixed hyperlipidemia  Basic Metabolic Panel    LIPID PANEL    Lipoprotein (a)    atorvastatin (LIPITOR) 80 MG tablet          Medical Decision Making: Today's Assessment/Status/Plan:   At this time, since patient is completely asymptomatic, I did go over the risks and the benefits of an invasive coronary angiogram or cardiac catheterization.  At this time, we will obtain a stat transthoracic echocardiogram to further assess for cardiac function and valvular function.  If reduced LVEF is seen, we will consider further invasive cardiac workup.  If LVEF remains normal without any valvular problems, we will optimize medical therapy.    We will start a atorvastatin 80 mg p.o. once a day with goal of LDL less than 55.    Based on elevated hemoglobin A1c along with evidence of obstructive CAD on CT coronary angiogram, I will put patient on Jardiance 10 mg p.o. once a day based on recent data for cardiac benefits of mortality reduction.    I will stop amlodipine and start patient on Bystolic 10 mg p.o. once a day along with valsartan 40 mg p.o. once a day based on guideline directed medical therapy.      Of note, during the care of this patient, I spent a  significant amount of time explaining the nature of the disease process, reviewing all possible imaging studies, blood test results to patient.  The overall care of this patient require a higher level of care than usual due to the multiple co-morbidities.    Elsy Leahy M.D.

## 2024-03-08 ENCOUNTER — PHARMACY VISIT (OUTPATIENT)
Dept: PHARMACY | Facility: MEDICAL CENTER | Age: 69
End: 2024-03-08
Payer: COMMERCIAL

## 2024-03-11 ENCOUNTER — ANCILLARY PROCEDURE (OUTPATIENT)
Dept: CARDIOLOGY | Facility: MEDICAL CENTER | Age: 69
End: 2024-03-11
Attending: INTERNAL MEDICINE
Payer: MEDICARE

## 2024-03-11 DIAGNOSIS — I25.10 CORONARY ARTERY DISEASE INVOLVING NATIVE CORONARY ARTERY OF NATIVE HEART WITHOUT ANGINA PECTORIS: ICD-10-CM

## 2024-03-11 DIAGNOSIS — I20.9 ANGINA PECTORIS, UNSPECIFIED (HCC): ICD-10-CM

## 2024-03-11 DIAGNOSIS — I10 HTN (HYPERTENSION), MALIGNANT: ICD-10-CM

## 2024-03-11 PROCEDURE — 93306 TTE W/DOPPLER COMPLETE: CPT

## 2024-03-12 LAB
LV EJECT FRACT  99904: 60
LV EJECT FRACT MOD 2C 99903: 60.11
LV EJECT FRACT MOD 4C 99902: 55.82
LV EJECT FRACT MOD BP 99901: 59.23

## 2024-03-12 PROCEDURE — 93306 TTE W/DOPPLER COMPLETE: CPT | Mod: 26 | Performed by: INTERNAL MEDICINE

## 2024-03-14 ENCOUNTER — HOSPITAL ENCOUNTER (OUTPATIENT)
Dept: LAB | Facility: MEDICAL CENTER | Age: 69
End: 2024-03-14
Attending: INTERNAL MEDICINE
Payer: MEDICARE

## 2024-03-14 DIAGNOSIS — I20.9 ANGINA PECTORIS, UNSPECIFIED (HCC): ICD-10-CM

## 2024-03-14 DIAGNOSIS — E11.65 TYPE 2 DIABETES MELLITUS WITH HYPERGLYCEMIA, WITHOUT LONG-TERM CURRENT USE OF INSULIN (HCC): ICD-10-CM

## 2024-03-14 DIAGNOSIS — I25.10 CORONARY ARTERY DISEASE INVOLVING NATIVE CORONARY ARTERY OF NATIVE HEART WITHOUT ANGINA PECTORIS: ICD-10-CM

## 2024-03-14 DIAGNOSIS — E78.2 MIXED HYPERLIPIDEMIA: ICD-10-CM

## 2024-03-14 LAB
ANION GAP SERPL CALC-SCNC: 13 MMOL/L (ref 7–16)
BUN SERPL-MCNC: 22 MG/DL (ref 8–22)
CALCIUM SERPL-MCNC: 9.3 MG/DL (ref 8.5–10.5)
CHLORIDE SERPL-SCNC: 104 MMOL/L (ref 96–112)
CHOLEST SERPL-MCNC: 193 MG/DL (ref 100–199)
CO2 SERPL-SCNC: 23 MMOL/L (ref 20–33)
CREAT SERPL-MCNC: 1.04 MG/DL (ref 0.5–1.4)
GFR SERPLBLD CREATININE-BSD FMLA CKD-EPI: 78 ML/MIN/1.73 M 2
GLUCOSE SERPL-MCNC: 97 MG/DL (ref 65–99)
HDLC SERPL-MCNC: 73 MG/DL
LDLC SERPL CALC-MCNC: 94 MG/DL
POTASSIUM SERPL-SCNC: 4.1 MMOL/L (ref 3.6–5.5)
SODIUM SERPL-SCNC: 140 MMOL/L (ref 135–145)
TRIGL SERPL-MCNC: 132 MG/DL (ref 0–149)

## 2024-03-14 PROCEDURE — 83036 HEMOGLOBIN GLYCOSYLATED A1C: CPT

## 2024-03-14 PROCEDURE — 80061 LIPID PANEL: CPT

## 2024-03-14 PROCEDURE — 80048 BASIC METABOLIC PNL TOTAL CA: CPT

## 2024-03-14 PROCEDURE — 83695 ASSAY OF LIPOPROTEIN(A): CPT

## 2024-03-14 PROCEDURE — 36415 COLL VENOUS BLD VENIPUNCTURE: CPT

## 2024-03-15 LAB
EST. AVERAGE GLUCOSE BLD GHB EST-MCNC: 120 MG/DL
HBA1C MFR BLD: 5.8 % (ref 4–5.6)

## 2024-03-16 LAB — LPA SERPL-MCNC: 19 MG/DL

## 2024-03-22 ENCOUNTER — APPOINTMENT (OUTPATIENT)
Dept: CARDIOLOGY | Facility: MEDICAL CENTER | Age: 69
End: 2024-03-22
Attending: INTERNAL MEDICINE
Payer: MEDICARE

## 2024-03-22 VITALS
DIASTOLIC BLOOD PRESSURE: 80 MMHG | RESPIRATION RATE: 14 BRPM | WEIGHT: 128 LBS | BODY MASS INDEX: 21.33 KG/M2 | HEART RATE: 73 BPM | OXYGEN SATURATION: 99 % | HEIGHT: 65 IN | SYSTOLIC BLOOD PRESSURE: 132 MMHG

## 2024-03-22 DIAGNOSIS — I10 ESSENTIAL HYPERTENSION, BENIGN: ICD-10-CM

## 2024-03-22 DIAGNOSIS — I25.10 CORONARY ARTERY DISEASE INVOLVING NATIVE CORONARY ARTERY OF NATIVE HEART WITHOUT ANGINA PECTORIS: ICD-10-CM

## 2024-03-22 DIAGNOSIS — E78.5 DYSLIPIDEMIA: ICD-10-CM

## 2024-03-22 PROCEDURE — 99212 OFFICE O/P EST SF 10 MIN: CPT | Performed by: INTERNAL MEDICINE

## 2024-03-22 PROCEDURE — 3079F DIAST BP 80-89 MM HG: CPT | Performed by: INTERNAL MEDICINE

## 2024-03-22 PROCEDURE — 99214 OFFICE O/P EST MOD 30 MIN: CPT | Performed by: INTERNAL MEDICINE

## 2024-03-22 PROCEDURE — 3075F SYST BP GE 130 - 139MM HG: CPT | Performed by: INTERNAL MEDICINE

## 2024-03-22 RX ORDER — AMLODIPINE BESYLATE 5 MG/1
5 TABLET ORAL DAILY
COMMUNITY

## 2024-03-22 ASSESSMENT — ENCOUNTER SYMPTOMS
GASTROINTESTINAL NEGATIVE: 1
CHILLS: 0
DIZZINESS: 0
COUGH: 0
MUSCULOSKELETAL NEGATIVE: 1
PSYCHIATRIC NEGATIVE: 1
DEPRESSION: 0
NEUROLOGICAL NEGATIVE: 1
PALPITATIONS: 0
WEIGHT LOSS: 0
MYALGIAS: 0
CLAUDICATION: 0
BRUISES/BLEEDS EASILY: 0
FOCAL WEAKNESS: 0
HEADACHES: 0
FEVER: 0
CARDIOVASCULAR NEGATIVE: 1
RESPIRATORY NEGATIVE: 1
ABDOMINAL PAIN: 0
NAUSEA: 0
NERVOUS/ANXIOUS: 0
CONSTITUTIONAL NEGATIVE: 1
BLURRED VISION: 0
SHORTNESS OF BREATH: 0
EYES NEGATIVE: 1
WEAKNESS: 0
DOUBLE VISION: 0
VOMITING: 0

## 2024-03-22 ASSESSMENT — FIBROSIS 4 INDEX: FIB4 SCORE: 0.89

## 2024-03-22 NOTE — PROGRESS NOTES
Chief Complaint   Patient presents with    Coronary Artery Disease     F/V Dx: Coronary artery disease involving native coronary artery of native heart without angina pectoris    Palpitations       Subjective     Yimi Norwood is a 68 y.o. male who presents today for follow up of coronary artery disease with history of diabetes mellitus, hypertension and hyperlipidemia.    Since the patient's last visit on 03/07/24 with Bobby Singh, he has been doing well clinically. He has had no chest pain since a year ago. He denies shortness of breath, palpitations, nausea/vomiting or diaphoresis. He retired 2 years ago and is active skiing and working on his 2 acres.     Past Medical History:   Diagnosis Date    CAD (coronary artery disease)     Diabetes (HCC)     Graves disease     Hyperlipidemia     Hypertension      Past Surgical History:   Procedure Laterality Date    OTHER ORTHOPEDIC SURGERY      right shoulder surgery     Family History   Problem Relation Age of Onset    Heart Attack Neg Hx     Heart Disease Neg Hx      Social History     Socioeconomic History    Marital status: Single     Spouse name: Not on file    Number of children: Not on file    Years of education: Not on file    Highest education level: Not on file   Occupational History    Not on file   Tobacco Use    Smoking status: Never    Smokeless tobacco: Never   Vaping Use    Vaping Use: Never used   Substance and Sexual Activity    Alcohol use: Never    Drug use: Never    Sexual activity: Not on file   Other Topics Concern    Not on file   Social History Narrative    Not on file     Social Determinants of Health     Financial Resource Strain: Not on file   Food Insecurity: Not on file   Transportation Needs: Not on file   Physical Activity: Not on file   Stress: Not on file   Social Connections: Not on file   Intimate Partner Violence: Not on file   Housing Stability: Not on file     No Known Allergies    (Medications reviewed.)  Outpatient  Encounter Medications as of 3/22/2024   Medication Sig Dispense Refill    amLODIPine (NORVASC) 5 MG Tab Take 5 mg by mouth every day.      ROCKLATAN 0.02-0.005 % Solution 1 Drop every day.      nebivolol (BYSTOLIC) 10 MG Tab Take 1 Tablet by mouth every day. 100 Tablet 4    valsartan (DIOVAN) 40 MG Tab Take 1 Tablet by mouth every day. 90 Tablet 4    aspirin 81 MG EC tablet Take 1 Tablet by mouth every day. 100 Tablet 4    Empagliflozin (JARDIANCE) 10 MG Tab tablet Take 1 Tablet by mouth every day. 100 Tablet 4    atorvastatin (LIPITOR) 80 MG tablet Take 1 Tablet by mouth every evening. (Patient taking differently: Take 20 mg by mouth every evening.) 100 Tablet 4    dorzolamide-timolol (COSOPT) 22.3-6.8 MG/ML Solution INSTILL 1 DROP INTO LEFT EYE TWICE DAILY      levothyroxine (SYNTHROID) 50 MCG TABS Take 88 mcg by mouth every day.       No facility-administered encounter medications on file as of 3/22/2024.     Review of Systems   Constitutional: Negative.  Negative for chills, fever, malaise/fatigue and weight loss.   HENT: Negative.  Negative for hearing loss.    Eyes: Negative.  Negative for blurred vision and double vision.   Respiratory: Negative.  Negative for cough and shortness of breath.    Cardiovascular: Negative.  Negative for chest pain, palpitations, claudication and leg swelling.   Gastrointestinal: Negative.  Negative for abdominal pain, nausea and vomiting.   Genitourinary: Negative.  Negative for dysuria and urgency.   Musculoskeletal: Negative.  Negative for joint pain and myalgias.   Skin: Negative.  Negative for itching and rash.   Neurological: Negative.  Negative for dizziness, focal weakness, weakness and headaches.   Endo/Heme/Allergies: Negative.  Does not bruise/bleed easily.   Psychiatric/Behavioral: Negative.  Negative for depression. The patient is not nervous/anxious.               Objective     /80 (BP Location: Left arm, Patient Position: Sitting, BP Cuff Size: Adult)   Pulse  "73   Resp 14   Ht 1.651 m (5' 5\")   Wt 58.1 kg (128 lb)   SpO2 99%   BMI 21.30 kg/m²     Physical Exam  Constitutional:       Appearance: Normal appearance. He is well-developed and normal weight.   HENT:      Head: Normocephalic and atraumatic.   Neck:      Vascular: No JVD.   Cardiovascular:      Rate and Rhythm: Normal rate and regular rhythm.      Heart sounds: Normal heart sounds.   Pulmonary:      Effort: Pulmonary effort is normal.      Breath sounds: Normal breath sounds.   Abdominal:      General: Bowel sounds are normal.      Palpations: Abdomen is soft.      Comments: No hepatosplenomegaly.   Musculoskeletal:         General: Normal range of motion.   Lymphadenopathy:      Cervical: No cervical adenopathy.   Skin:     General: Skin is warm and dry.   Neurological:      Mental Status: He is alert and oriented to person, place, and time.            CARDIAC STUDIES/PROCEDURES:    CTA OF HEART 3D (03/04/24)   1.  Calcium score of 235.3.  2.  Focal, mixed but predominantly soft plaque in the proximal RCA results in greater than 70% stenosis versus subtotal occlusion. Right dominant circulation. Consider cardiac catheterization.  3.  Mild, predominately calcific plaque in the LAD results in no hemodynamically significant stenosis (less than 50%).  4.  No plaque or stenosis in the LCx.  (study result reviewed)     ECHOCARDIOGRAM CONCLUSIONS (03/11/24)  No prior study is available for comparison.   Normal left ventricular systolic function.   There is basal to mid inferior wall hypokinesis.  No evidence of valvular abnormality based on Doppler evaluation.   Estimated right ventricular systolic pressure is 25 mmHg.  (study result reviewed)     EKG performed on (07/06/23) was reviewed: EKG personally interpreted shows sinus rhythm with inferior ST segment depression andT wave abnormalities.     Laboratory results of (03/14/24) were reviewed. Cholesterol profile of 193/132/73/94 mg/dL noted.     PET SCAN " (07/06/23)  NUCLEAR IMAGING INTERPRETATION  There is decreased perfusion from mid to basal inferoseptal,   inferior and inferolateral wall segments with reversible ischemia.    Extracardiac uptake noted as well.    Artifact versus true ischemia.  Normal left ventricular size, ejection fraction, and wall motion.  Rest EF = 52%  Stress EF = 54%  Upper limit TID = 1.21  Normal total flow reserve = 2.28  ECG INTERPRETATION  Nondiagnostic ECG portion of pharmacological stress study.  (study result reviewed)     Assessment & Plan     1. Coronary artery disease involving native coronary artery of native heart without angina pectoris        2. Essential hypertension, benign        3. Dyslipidemia            Medical Decision Making: Today's Assessment/Status/Plan:        Coronary artery disease: He remains clinically doing well. I will continue with current medical care including aspirin, nebivolol, valsartan, atorvastatin. We did discuss the options of performing cardiac catheterization, however, he does not wish to schedule this procedure at this time.   Hypertension: Blood pressure is well controlled. We will continue with amlodipine, beta blockade therapy and angiotensin receptor blockade.  Hyperlipidemia: He is doing well on statin therapy without myalgia symptoms. We will perform labs including fasting lipid profile.     We will follow up in 3 months.    CC Bobby Wahl

## 2024-05-28 ENCOUNTER — TELEPHONE (OUTPATIENT)
Dept: CARDIOLOGY | Facility: MEDICAL CENTER | Age: 69
End: 2024-05-28
Payer: MEDICARE

## 2024-05-28 PROCEDURE — RXMED WILLOW AMBULATORY MEDICATION CHARGE: Performed by: INTERNAL MEDICINE

## 2024-05-28 NOTE — TELEPHONE ENCOUNTER
Contact:  Phone number:[phone] (home)    Name of person spoken with and relationship to patient: PATIENT   Patient’s Adherence:  How patient is doing on medication: Well    How many missed doses and reason: 0 N/A    Any new medications: No    Any new conditions: No    Any new allergies: No    Any new side effects: No    Any new diagnoses: No    How many doses remaining: 10    Did patient want to speak with pharmacist: No   Delivery:  Delivery date and method: 5/30/24 via USPS    Needs by Date: 6/7/24    Signature required: No     Any additional details for : N/A   Teach Appointment Date:  N/A   Shipping Address:  [address] [place] [address]   Medication(name,strength and dose):  JARDIANCE 10MG TABLET   Copay:  $94   Payment Method:  Credit card on file   Supplies:  N/A   Additional Information:  N/A

## 2024-05-29 ENCOUNTER — PHARMACY VISIT (OUTPATIENT)
Dept: PHARMACY | Facility: MEDICAL CENTER | Age: 69
End: 2024-05-29
Payer: COMMERCIAL

## 2024-06-20 ENCOUNTER — HOSPITAL ENCOUNTER (OUTPATIENT)
Dept: LAB | Facility: MEDICAL CENTER | Age: 69
End: 2024-06-20
Attending: INTERNAL MEDICINE
Payer: MEDICARE

## 2024-06-20 DIAGNOSIS — E78.5 DYSLIPIDEMIA: ICD-10-CM

## 2024-06-20 LAB
ALBUMIN SERPL BCP-MCNC: 4.1 G/DL (ref 3.2–4.9)
ALBUMIN/GLOB SERPL: 1.6 G/DL
ALP SERPL-CCNC: 94 U/L (ref 30–99)
ALT SERPL-CCNC: 31 U/L (ref 2–50)
ANION GAP SERPL CALC-SCNC: 10 MMOL/L (ref 7–16)
AST SERPL-CCNC: 20 U/L (ref 12–45)
BILIRUB SERPL-MCNC: 0.7 MG/DL (ref 0.1–1.5)
BUN SERPL-MCNC: 19 MG/DL (ref 8–22)
CALCIUM ALBUM COR SERPL-MCNC: 9 MG/DL (ref 8.5–10.5)
CALCIUM SERPL-MCNC: 9.1 MG/DL (ref 8.5–10.5)
CHLORIDE SERPL-SCNC: 108 MMOL/L (ref 96–112)
CHOLEST SERPL-MCNC: 143 MG/DL (ref 100–199)
CO2 SERPL-SCNC: 23 MMOL/L (ref 20–33)
CREAT SERPL-MCNC: 0.78 MG/DL (ref 0.5–1.4)
FASTING STATUS PATIENT QL REPORTED: NORMAL
GFR SERPLBLD CREATININE-BSD FMLA CKD-EPI: 97 ML/MIN/1.73 M 2
GLOBULIN SER CALC-MCNC: 2.6 G/DL (ref 1.9–3.5)
GLUCOSE SERPL-MCNC: 98 MG/DL (ref 65–99)
HDLC SERPL-MCNC: 76 MG/DL
LDLC SERPL CALC-MCNC: 55 MG/DL
POTASSIUM SERPL-SCNC: 4.5 MMOL/L (ref 3.6–5.5)
PROT SERPL-MCNC: 6.7 G/DL (ref 6–8.2)
SODIUM SERPL-SCNC: 141 MMOL/L (ref 135–145)
TRIGL SERPL-MCNC: 62 MG/DL (ref 0–149)

## 2024-06-20 PROCEDURE — 36415 COLL VENOUS BLD VENIPUNCTURE: CPT

## 2024-06-20 PROCEDURE — 80061 LIPID PANEL: CPT

## 2024-06-20 PROCEDURE — 80053 COMPREHEN METABOLIC PANEL: CPT

## 2024-07-24 ENCOUNTER — APPOINTMENT (OUTPATIENT)
Dept: CARDIOLOGY | Facility: MEDICAL CENTER | Age: 69
End: 2024-07-24
Attending: INTERNAL MEDICINE
Payer: MEDICARE

## 2024-07-26 ENCOUNTER — TELEPHONE (OUTPATIENT)
Dept: CARDIOLOGY | Facility: MEDICAL CENTER | Age: 69
End: 2024-07-26
Payer: MEDICARE

## 2024-07-26 DIAGNOSIS — I25.10 CORONARY ARTERY DISEASE INVOLVING NATIVE CORONARY ARTERY OF NATIVE HEART WITHOUT ANGINA PECTORIS: ICD-10-CM

## 2024-07-26 RX ORDER — VALSARTAN 40 MG/1
20 TABLET ORAL DAILY
Qty: 100 TABLET | Refills: 1 | Status: SHIPPED | OUTPATIENT
Start: 2024-07-26

## 2024-08-01 ENCOUNTER — APPOINTMENT (OUTPATIENT)
Dept: ADMISSIONS | Facility: MEDICAL CENTER | Age: 69
End: 2024-08-01
Attending: INTERNAL MEDICINE
Payer: MEDICARE

## 2024-08-02 ENCOUNTER — TELEPHONE (OUTPATIENT)
Dept: CARDIOLOGY | Facility: MEDICAL CENTER | Age: 69
End: 2024-08-02
Payer: MEDICARE

## 2024-08-02 NOTE — TELEPHONE ENCOUNTER
----- Message from Lake Cameron M.D. sent at 7/26/2024 12:16 PM PDT -----  Please move up the follow up appointment to discuss further.  Please add the patient on to my Monday or Tuesday clinic at the end of day to discuss in person. Thank you.  CHELA.   ----- Message -----  From: Lake Cameron M.D.  Sent: 7/1/2024   8:37 AM PDT  To: Lake Cameron M.D.    CAD, good lipids

## 2024-08-02 NOTE — TELEPHONE ENCOUNTER
Phone Number Called: [phone]    Call outcome: Left detailed message for patient. Informed to call back with any additional questions.    Message: Called to confirm appt scheduled with JI post cath. Asked pt to call back and also indicated I was sending a MyChart that he could respond to as well.

## 2024-08-06 ENCOUNTER — PATIENT MESSAGE (OUTPATIENT)
Dept: CARDIOLOGY | Facility: MEDICAL CENTER | Age: 69
End: 2024-08-06
Payer: MEDICARE

## 2024-08-08 ENCOUNTER — PRE-ADMISSION TESTING (OUTPATIENT)
Dept: ADMISSIONS | Facility: MEDICAL CENTER | Age: 69
End: 2024-08-08
Attending: INTERNAL MEDICINE
Payer: MEDICARE

## 2024-08-08 RX ORDER — LEVOTHYROXINE SODIUM 88 UG/1
88 TABLET ORAL
COMMUNITY

## 2024-08-08 NOTE — OR NURSING
Pre-Admit RN appointment completed with pt, by this RN, over the phone, for 8/23/24. Discussed pre-procedure instructions. Instructed pt to hold Jardiance AM of procedure. Pt verbalized understanding of all instructions. Pt denies any questions or concerns.

## 2024-08-19 ENCOUNTER — PRE-ADMISSION TESTING (OUTPATIENT)
Dept: ADMISSIONS | Facility: MEDICAL CENTER | Age: 69
End: 2024-08-19
Attending: INTERNAL MEDICINE
Payer: MEDICARE

## 2024-08-19 DIAGNOSIS — Z01.812 PRE-OPERATIVE LABORATORY EXAMINATION: ICD-10-CM

## 2024-08-19 DIAGNOSIS — Z01.810 PRE-OPERATIVE CARDIOVASCULAR EXAMINATION: ICD-10-CM

## 2024-08-19 LAB
ALBUMIN SERPL BCP-MCNC: 4.1 G/DL (ref 3.2–4.9)
ALBUMIN/GLOB SERPL: 1.4 G/DL
ALP SERPL-CCNC: 100 U/L (ref 30–99)
ALT SERPL-CCNC: 28 U/L (ref 2–50)
ANION GAP SERPL CALC-SCNC: 11 MMOL/L (ref 7–16)
APTT PPP: 38.3 SEC (ref 24.7–36)
AST SERPL-CCNC: 33 U/L (ref 12–45)
BILIRUB SERPL-MCNC: 1 MG/DL (ref 0.1–1.5)
BUN SERPL-MCNC: 21 MG/DL (ref 8–22)
CALCIUM ALBUM COR SERPL-MCNC: 9 MG/DL (ref 8.5–10.5)
CALCIUM SERPL-MCNC: 9.1 MG/DL (ref 8.5–10.5)
CHLORIDE SERPL-SCNC: 106 MMOL/L (ref 96–112)
CO2 SERPL-SCNC: 23 MMOL/L (ref 20–33)
CREAT SERPL-MCNC: 0.95 MG/DL (ref 0.5–1.4)
EKG IMPRESSION: NORMAL
ERYTHROCYTE [DISTWIDTH] IN BLOOD BY AUTOMATED COUNT: 49.1 FL (ref 35.9–50)
GFR SERPLBLD CREATININE-BSD FMLA CKD-EPI: 87 ML/MIN/1.73 M 2
GLOBULIN SER CALC-MCNC: 2.9 G/DL (ref 1.9–3.5)
GLUCOSE SERPL-MCNC: 120 MG/DL (ref 65–99)
HCT VFR BLD AUTO: 46.6 % (ref 42–52)
HGB BLD-MCNC: 15 G/DL (ref 14–18)
INR PPP: 0.93 (ref 0.87–1.13)
MCH RBC QN AUTO: 31.1 PG (ref 27–33)
MCHC RBC AUTO-ENTMCNC: 32.2 G/DL (ref 32.3–36.5)
MCV RBC AUTO: 96.5 FL (ref 81.4–97.8)
PLATELET # BLD AUTO: 240 K/UL (ref 164–446)
PMV BLD AUTO: 8.1 FL (ref 9–12.9)
POTASSIUM SERPL-SCNC: 4.5 MMOL/L (ref 3.6–5.5)
PROT SERPL-MCNC: 7 G/DL (ref 6–8.2)
PROTHROMBIN TIME: 12.5 SEC (ref 12–14.6)
RBC # BLD AUTO: 4.83 M/UL (ref 4.7–6.1)
SODIUM SERPL-SCNC: 140 MMOL/L (ref 135–145)
WBC # BLD AUTO: 7.5 K/UL (ref 4.8–10.8)

## 2024-08-19 PROCEDURE — 36415 COLL VENOUS BLD VENIPUNCTURE: CPT

## 2024-08-19 PROCEDURE — 93005 ELECTROCARDIOGRAM TRACING: CPT

## 2024-08-19 PROCEDURE — 85730 THROMBOPLASTIN TIME PARTIAL: CPT

## 2024-08-19 PROCEDURE — 93010 ELECTROCARDIOGRAM REPORT: CPT | Performed by: INTERNAL MEDICINE

## 2024-08-19 PROCEDURE — 80053 COMPREHEN METABOLIC PANEL: CPT

## 2024-08-19 PROCEDURE — 85610 PROTHROMBIN TIME: CPT

## 2024-08-19 PROCEDURE — 85027 COMPLETE CBC AUTOMATED: CPT

## 2024-08-23 ENCOUNTER — APPOINTMENT (OUTPATIENT)
Dept: CARDIOLOGY | Facility: MEDICAL CENTER | Age: 69
End: 2024-08-23
Attending: INTERNAL MEDICINE
Payer: MEDICARE

## 2024-08-23 ENCOUNTER — HOSPITAL ENCOUNTER (OUTPATIENT)
Facility: MEDICAL CENTER | Age: 69
End: 2024-08-23
Attending: INTERNAL MEDICINE | Admitting: INTERNAL MEDICINE
Payer: MEDICARE

## 2024-08-23 VITALS
WEIGHT: 120.59 LBS | OXYGEN SATURATION: 98 % | TEMPERATURE: 97.5 F | HEIGHT: 65 IN | BODY MASS INDEX: 20.09 KG/M2 | SYSTOLIC BLOOD PRESSURE: 122 MMHG | DIASTOLIC BLOOD PRESSURE: 66 MMHG | HEART RATE: 53 BPM | RESPIRATION RATE: 18 BRPM

## 2024-08-23 DIAGNOSIS — I25.10 CORONARY ARTERY DISEASE INVOLVING NATIVE CORONARY ARTERY OF NATIVE HEART WITHOUT ANGINA PECTORIS: ICD-10-CM

## 2024-08-23 DIAGNOSIS — R94.39 POSITIVE CARDIAC STRESS TEST: ICD-10-CM

## 2024-08-23 DIAGNOSIS — I20.9 ANGINA PECTORIS, UNSPECIFIED (HCC): ICD-10-CM

## 2024-08-23 LAB — EKG IMPRESSION: NORMAL

## 2024-08-23 PROCEDURE — 92928 PRQ TCAT PLMT NTRAC ST 1 LES: CPT | Mod: RC | Performed by: INTERNAL MEDICINE

## 2024-08-23 PROCEDURE — 92978 ENDOLUMINL IVUS OCT C 1ST: CPT | Mod: RC

## 2024-08-23 PROCEDURE — 92978 ENDOLUMINL IVUS OCT C 1ST: CPT | Mod: 26,RC | Performed by: INTERNAL MEDICINE

## 2024-08-23 PROCEDURE — 160046 HCHG PACU - 1ST 60 MINS PHASE II

## 2024-08-23 PROCEDURE — 700102 HCHG RX REV CODE 250 W/ 637 OVERRIDE(OP)

## 2024-08-23 PROCEDURE — 160002 HCHG RECOVERY MINUTES (STAT)

## 2024-08-23 PROCEDURE — 99152 MOD SED SAME PHYS/QHP 5/>YRS: CPT | Performed by: INTERNAL MEDICINE

## 2024-08-23 PROCEDURE — A9270 NON-COVERED ITEM OR SERVICE: HCPCS

## 2024-08-23 PROCEDURE — 700117 HCHG RX CONTRAST REV CODE 255: Performed by: INTERNAL MEDICINE

## 2024-08-23 PROCEDURE — 93458 L HRT ARTERY/VENTRICLE ANGIO: CPT | Mod: 26,59 | Performed by: INTERNAL MEDICINE

## 2024-08-23 PROCEDURE — 93005 ELECTROCARDIOGRAM TRACING: CPT | Performed by: INTERNAL MEDICINE

## 2024-08-23 PROCEDURE — 160035 HCHG PACU - 1ST 60 MINS PHASE I

## 2024-08-23 PROCEDURE — 700101 HCHG RX REV CODE 250

## 2024-08-23 PROCEDURE — 160047 HCHG PACU  - EA ADDL 30 MINS PHASE II

## 2024-08-23 PROCEDURE — 700111 HCHG RX REV CODE 636 W/ 250 OVERRIDE (IP): Mod: JZ

## 2024-08-23 RX ORDER — LIDOCAINE HYDROCHLORIDE 20 MG/ML
INJECTION, SOLUTION INFILTRATION; PERINEURAL
Status: COMPLETED
Start: 2024-08-23 | End: 2024-08-23

## 2024-08-23 RX ORDER — SODIUM CHLORIDE 9 MG/ML
INJECTION, SOLUTION INTRAVENOUS CONTINUOUS
Status: DISCONTINUED | OUTPATIENT
Start: 2024-08-23 | End: 2024-08-23 | Stop reason: HOSPADM

## 2024-08-23 RX ORDER — PRASUGREL 10 MG/1
60 TABLET, FILM COATED ORAL ONCE
Status: DISCONTINUED | OUTPATIENT
Start: 2024-08-23 | End: 2024-08-23

## 2024-08-23 RX ORDER — PRASUGREL 10 MG/1
10 TABLET, FILM COATED ORAL DAILY
Qty: 90 TABLET | Refills: 1 | Status: SHIPPED | OUTPATIENT
Start: 2024-08-23

## 2024-08-23 RX ORDER — ACETAMINOPHEN 325 MG/1
650 TABLET ORAL EVERY 6 HOURS PRN
Status: DISCONTINUED | OUTPATIENT
Start: 2024-08-23 | End: 2024-08-23 | Stop reason: HOSPADM

## 2024-08-23 RX ORDER — PRASUGREL 10 MG/1
10 TABLET, FILM COATED ORAL DAILY
Status: DISCONTINUED | OUTPATIENT
Start: 2024-08-24 | End: 2024-08-23 | Stop reason: HOSPADM

## 2024-08-23 RX ORDER — PRASUGREL 10 MG/1
TABLET, FILM COATED ORAL
Status: COMPLETED
Start: 2024-08-23 | End: 2024-08-23

## 2024-08-23 RX ORDER — VERAPAMIL HYDROCHLORIDE 2.5 MG/ML
INJECTION, SOLUTION INTRAVENOUS
Status: COMPLETED
Start: 2024-08-23 | End: 2024-08-23

## 2024-08-23 RX ORDER — MIDAZOLAM HYDROCHLORIDE 1 MG/ML
INJECTION INTRAMUSCULAR; INTRAVENOUS
Status: COMPLETED
Start: 2024-08-23 | End: 2024-08-23

## 2024-08-23 RX ORDER — BIVALIRUDIN 250 MG/5ML
INJECTION, POWDER, LYOPHILIZED, FOR SOLUTION INTRAVENOUS
Status: COMPLETED
Start: 2024-08-23 | End: 2024-08-23

## 2024-08-23 RX ORDER — HEPARIN SODIUM 1000 [USP'U]/ML
INJECTION, SOLUTION INTRAVENOUS; SUBCUTANEOUS
Status: COMPLETED
Start: 2024-08-23 | End: 2024-08-23

## 2024-08-23 RX ORDER — HEPARIN SODIUM 200 [USP'U]/100ML
INJECTION, SOLUTION INTRAVENOUS
Status: COMPLETED
Start: 2024-08-23 | End: 2024-08-23

## 2024-08-23 RX ORDER — ASPIRIN 81 MG/1
81 TABLET ORAL DAILY
Status: DISCONTINUED | OUTPATIENT
Start: 2024-08-24 | End: 2024-08-23 | Stop reason: HOSPADM

## 2024-08-23 RX ADMIN — VERAPAMIL HYDROCHLORIDE 5 MG: 2.5 INJECTION, SOLUTION INTRAVENOUS at 11:31

## 2024-08-23 RX ADMIN — NITROGLYCERIN 10 ML: 20 INJECTION INTRAVENOUS at 11:31

## 2024-08-23 RX ADMIN — BIVALIRUDIN 250 MG: 250 INJECTION, POWDER, LYOPHILIZED, FOR SOLUTION INTRAVENOUS at 12:33

## 2024-08-23 RX ADMIN — MIDAZOLAM HYDROCHLORIDE 1.5 MG: 1 INJECTION, SOLUTION INTRAMUSCULAR; INTRAVENOUS at 12:33

## 2024-08-23 RX ADMIN — HEPARIN SODIUM: 1000 INJECTION, SOLUTION INTRAVENOUS; SUBCUTANEOUS at 11:31

## 2024-08-23 RX ADMIN — FENTANYL CITRATE 50 MCG: 50 INJECTION, SOLUTION INTRAMUSCULAR; INTRAVENOUS at 12:34

## 2024-08-23 RX ADMIN — PRASUGREL 60 MG: 10 TABLET, FILM COATED ORAL at 12:36

## 2024-08-23 RX ADMIN — LIDOCAINE HYDROCHLORIDE: 20 INJECTION, SOLUTION INFILTRATION; PERINEURAL at 11:31

## 2024-08-23 RX ADMIN — HEPARIN SODIUM 2000 UNITS: 200 INJECTION, SOLUTION INTRAVENOUS at 11:31

## 2024-08-23 RX ADMIN — IOHEXOL 100 ML: 350 INJECTION, SOLUTION INTRAVENOUS at 12:33

## 2024-08-23 ASSESSMENT — ENCOUNTER SYMPTOMS
SHORTNESS OF BREATH: 0
DIZZINESS: 0
ORTHOPNEA: 0
PALPITATIONS: 0
LOSS OF CONSCIOUSNESS: 0

## 2024-08-23 ASSESSMENT — FIBROSIS 4 INDEX: FIB4 SCORE: 1.77

## 2024-08-23 NOTE — OR NURSING
1250 patient arrived from cath lab, report received, attached to monitoring. VSS, patient oxygenating well on room air, s/p heart cath with right radial access, TR band in place with 13 cc in balloon, angiomax gtt on for 2 hrs, patient denies pain and nausea, plan of care discussed, questions answered     1330 patient drinking water, tolerates well     1450 small amount of blood noted on TR band, cleaned with gauze,     1422 1 cc of air removed from TR band, continues soft, dry, intact    1434 Angiomax gtt stopped    1437 1 cc of air removed from TR band, continues soft, dry, intact     1452 1 cc of air removed from TR band, continues soft, dry, intact    1445 telephone updates to friend, Sudheer    1500 APRN at bedside to assess patient, per APRN patient okay to dc home     1507 1 cc of air removed from TR band, continues soft, dry, intact    1522 2 cc of air removed from TR band, continues soft, dry, intact    1537 2 cc of air removed from TR band, continues soft, dry, intact    1552 3 cc of air removed from TR band, continues soft, dry, intact    1607 2 cc of air removed from TR band, continues soft, dry, intact    1615 dc instructions discussed with patient and patient's friend Sudheer, questions answered, verbalized understanding, patient getting dressed piv dc     1618 patient dc home in stable condition, vss, right radial access clean/dry/intact/soft with gauze and tegaderm in place, all belongings with patient and accounted for, escorted out with this RN.

## 2024-08-23 NOTE — DISCHARGE INSTRUCTIONS
POST ANGIOGRAM  General Care Instructions  Maintain a bandage over the incision site for 24 hours.  It's normal to find a small bruise or dime-sized lump at the insertion site. This should disappear within a few weeks.  Do not apply lotions or powders to the site.  Do not immerse the catheter insertion site in water (bathtub/swimming) for five days. It is ok to shower 24 hours after the procedure.  You may resume your normal diet immediately; on the day of your procedure, drink 6-10 glasses of water to help flush the contrast liquid out of your system.  If the doctor inserted the catheter in through your groin:  Walking short distances on a flat surface is OK. Limit going up/down stairs for the first 2 days.  DO NOT do yard work, drive, squat, lift heavy objects, or play sports for 2 days; or until your health care provider tells you it is OK.  If the doctor inserted the catheter in your arm:  For 3 days, DO NOT lift anything heavier than 10 pounds (approximately a gallon of milk). DO NOT do any heavy pushing, pulling, or twisting.    Medications  If your current medications need to be changed, you will be provided with an updated list of your medications prior to discharge.  If you take warfarin (Coumadin), resume taking your usual dose the evening after the procedure.  DO NOT STOP taking prescribed blood thinning (anti-platelet) medications unless instructed by your cardiologist.  These medications include:  Aspirin, Clopidogrel (Plavix), Ticagrelor (Brilinta), or Prasugrel (Effient)   If you take one of the following anticoagulants, RESUME 24 HOURS after your procedure:  Apixiban (Eliquis), Rivaroxaban (Xarelto), Dabigatran (Pradaxa), Edoxaban (Savaysa)  If you take metformin (Glucophage), RESUME 48 HOURS after your procedure.    When to call your healthcare provider  Call your cardiologist right away at [phone] if you have any of the following:   Problems/Concerns taking any of your prescribed heart  medicines.   The insertion site has increasing pain, swelling, redness, bleeding, or drainage.   Your arm or leg below where the insertion site changes color, is cool, or is numb.   You have chest pain or shortness of breath that does not go away with rest.   Your pulse feels irregular -- very slow (less than 60 beats/minute) or very fast (over 100 beats/minute).   You have dizziness, fainting, or you are very tired.   You are coughing up blood or yellow or green mucus.   You have chills or a fever over 101°F (38.3°C).    If there is bleeding at the catheter insertion site, apply pressure for 10 minutes.  If bleeding persists, call 911, and continue to hold pressure until advanced medical support arrives.        Exercising Safely After Percutaneous Coronary Intervention (PCI)  After percutaneous coronary intervention (PCI), which involves angioplasty and often stenting, it's important to focus on your heart health. Exercise can help strengthen your heart. It can also help you feel good and improve your overall health. Talk with your health care provider or cardiac rehab team member about good options for you.  Start slowly. Work up to more vigorous exercise as you get stronger. Aim for at least 150 minutes of exercise each week.  Include aerobic activities. These make the heart beat faster. They work the heart and lungs, and improve the body's ability to use oxygen. Good choices include walking, swimming, and biking .  Always follow your doctor's recommendation for exercise.   You have been referred to cardiac rehabilitation, which is important for your recovery.  You may contact [facility] at [phone] to learn more and schedule a visit.        Lifestyle Management After Percutaneous Coronary Intervention (PCI)  Percutaneous coronary intervention (PCI)  involves angioplasty and often stenting. This procedure can open arteries and relieve symptoms. But, it doesn't cure coronary artery  disease. New blockages can still form. You need to take steps to prevent this by managing risk factors. Doing so will help make your heart and arteries healthier. Your doctor may prescribe cardiac rehabilitation to help with this lifelong process.  Understanding risk factors  Some risk factors for coronary artery disease can be controlled. These include smoking, high blood pressure, cholesterol, diabetes, and obesity. They can be managed with medication, diet, and exercise. Support and counseling can also play a role. The effort will pay off! Managing risk factors can help you be more active, feel better, and reduce the risk of heart attack.    If you smoke, quit!  If your doctor has been urging you to quit smoking, it's for good reasons. Smoking damages your heart, blood vessels, and lungs. The good news is that quitting can halt or even reverse the damage of smoking. To quit now:  Get medical help. Ask your doctor for advice on stop-smoking programs. Also ask about medications or nicotine replacement therapy products that may help you quit smoking.  Get support. Join a support group. Ask for help from your family and friends.  Don't give up. It often takes several tries to succeed in quitting smoking.  Avoid secondhand smoke. Ask family and friends not to smoke around you.

## 2024-08-23 NOTE — PROCEDURES
Cardiac Catheterization report    8/23/2024  2:25 PM    REFERRING MD: Dr. Cameron  Operating assistant: Dr. Simon Serna    INDICATION/PREOPERATIVE DIAGNOSIS:  Angina, progressive abnormal stress test  Abnormal stress test    POSTOPERATIVE DIAGNOSIS:  99% culprit mid RCA stenosis  50% eccentric LAD nonobstructive coronary artery disease.  LVEDP 18 mmHg  Successful IVUS guided PCI mid RCA (3.5 x 30 mm Synergy Megatron ISABEL), excellent result    RECOMMENDATIONS:  Guideline directed medical therapy   Cardiovascular Risk factor modification  Dual antiplatelet therapy for minimum 6 months      PROCEDURES PERFORMED:  Coronary arteriograms  Left heart catheterization   Supervision moderate sedation  Percutaneous coronary intervention  And intravascular ultrasound      FINDINGS:  I.  HEMODYNAMICS   Ao: 122/70 mmHg   LEDP: 18 mmHg   Gradient on LV pullback: No    II. CORONARY ANGIOGRAPHY:  Left main coronary artery: Large bifurcating no CAD  Left anterior descending artery: Large caliber eccentric 50% stenosis proximally usual complement diagonals nonobstructive coronary disease.  Left circumflex coronary artery: Moderate caliber mild nonobstructive CAD  Right coronary artery: Large-caliber dominant with a 98 to 99% focal stenosis and distal left to right collateralization faintly.  Post PCI there is a percent residual stenosis and SPARKLE-3 flow.        Procedure details:  The risks and benefits of cardiac catheterization and possible intervention were explained to the patient including death, heart attack, stroke, and emergency surgery.  The patient was brought to the cardiac catheterization lab where the right wrist was prepped and draped in the usual manner for cardiac catheterization.  The area was anesthetized with lidocaine and a 5/6 FR sheath was inserted into the right radial artery without difficulty. A Yohannes catheter was advanced to the ostia of the Left coronary artery and arteriograms were recorded.   A Yohannes  "catheter was advanced to the ostia of the right coronary artery and arteriograms were recorded. Aortic valve was crossed using Yohannes catheter for left heart catheterization was performed.     Description of PCI:  The decision was made to intervene on the culprit coronary artery.  Anticoagulation was initiated as below.  The diagnostic catheter was exchanged over a wire for an 6 Azeri JR4 guide seated appropriately. A 0.014\" mm  Runthrough was advanced into the artery and use to cross the lesion. A 3.0 mm balloon was used to predilate the lesion.  IVUS was advanced this lesion manual pullback recorded.  A 3.5 x 30 mm Synergy Megatron ISABEL was then positioned and deployed at nominal pressure. Following this a 3.5 mm noncompliant balloon high-pressure followed by 4.0 mm noncompliant balloon high-pressure proximally was used to post dilate the stent. There was an excellent angiographic result with SPARKLE-3 flow and no residual stenosis in the stented segment. All catheters and guidewires were removed.    At the completion of the case the sheath was removed and hemostasis achieved utilizing a radial compression band patient left Cath Lab in stable condition and there were no apparent complications.    Anticoagulant: Angiomax  Antiplatelet: Effient (prasugrel)  EBL <25 cc  Complications: none  Specimens: none  Contrast: 100 cc    Complications:  None apparent    Sedation time:  Moderate sedation directly monitored by me during the case while supervising the administration of the sedation medication by an independent trained RN to assist in the monitoring of the patient's level of consciousness and physiological status. I, the supervising physician was present the entire time from beginning of medication administration until the end of the procedure from 11:36 AM until 12:30 PM. For detailed administration records please see the moderate sedation documentation in the media tab.    Following the procedure I discussed the " results with the patient .    Dugn Cunningham MD, FACC, [facility]

## 2024-08-23 NOTE — H&P
History:  Primary Diagnosis: CAD     HPI:  Yimi Iida patient of Dr. Cameron with significant history of CAD, HTN, HLD, DM. Recent cardiac imaging, coronary CT angiogram which showed evidence of obstructive disease in right coronary artery.     Currently patient denies chest pain, shortness of breath or palpitations.     Medical history:   Past Medical History:   Diagnosis Date    Arthritis     CAD (coronary artery disease)     Cataract     Delayed emergence from general anesthesia     Diabetes (HCC)     Glaucoma     Graves disease     High cholesterol     Hyperlipidemia     Hypertension        Surgical history:   Past Surgical History:   Procedure Laterality Date    OTHER ORTHOPEDIC SURGERY      right shoulder surgery       Social history:   Social History     Tobacco Use   Smoking Status Never   Smokeless Tobacco Never      Social History     Substance and Sexual Activity   Alcohol Use Not Currently      Social History     Substance and Sexual Activity   Drug Use Never        Family history:   Family History   Problem Relation Age of Onset    Heart Attack Neg Hx     Heart Disease Neg Hx        Allergies: No Known Allergies    Home medications: No current facility-administered medications for this encounter.    Current Outpatient Medications:     levothyroxine (SYNTHROID) 88 MCG Tab, Take 88 mcg by mouth every morning on an empty stomach., Disp: , Rfl:     valsartan (DIOVAN) 40 MG Tab, Take 0.5 Tablets by mouth every day., Disp: 100 Tablet, Rfl: 1    ROCKLATAN 0.02-0.005 % Solution, 1 Drop every day. L eye, Disp: , Rfl:     nebivolol (BYSTOLIC) 10 MG Tab, Take 1 Tablet by mouth every day., Disp: 100 Tablet, Rfl: 4    aspirin 81 MG EC tablet, Take 1 Tablet by mouth every day., Disp: 100 Tablet, Rfl: 4    Empagliflozin (JARDIANCE) 10 MG Tab tablet, Take 1 Tablet by mouth every day., Disp: 100 Tablet, Rfl: 4    atorvastatin (LIPITOR) 80 MG tablet, Take 1 Tablet by mouth every evening. (Patient taking differently: Take  40 mg by mouth every evening.), Disp: 100 Tablet, Rfl: 4    dorzolamide-timolol (COSOPT) 22.3-6.8 MG/ML Solution, INSTILL 1 DROP INTO LEFT EYE TWICE DAILY, Disp: , Rfl:     Review of Systems:  Review of Systems   Respiratory:  Negative for shortness of breath.    Cardiovascular:  Negative for chest pain, palpitations, orthopnea and leg swelling.   Neurological:  Negative for dizziness and loss of consciousness.   All other systems reviewed and are negative.      Physical Examination:  Physical Exam    Impression:  Abnormal coronary CT suspicious for CAD to RCA    Plan:  Left cardiac cath with possible intervention.  With Dr. Cunningham     The risks, benefits, and alternatives to coronary angiography with IV sedation were discussed in great detail. Specific risks mentioned include bleeding, infection, kidney damage, allergic reaction, cardiac perforation with possible tamponade requiring pericardiocentesis or possibly open heart surgery. In addition, we discussed that 10% of patients will experience small to moderate bruising at the site of the arterial puncture. Lastly, the risks of heart attack, stroke and death were discussed; the risk of major complications such as heart attack or stroke caused by the angiogram is approximately 1%; the risk of death is approximately 1 in 1000. The patient verbalized understanding of the potential complications and wishes to proceed with this procedure.    The risks/benefits of the procedure will be further discussed with the consenting physician performing the procedure.

## 2024-08-27 DIAGNOSIS — E78.2 MIXED HYPERLIPIDEMIA: ICD-10-CM

## 2024-08-27 RX ORDER — ATORVASTATIN CALCIUM 80 MG/1
80 TABLET, FILM COATED ORAL NIGHTLY
Qty: 100 TABLET | Refills: 0 | Status: SHIPPED | OUTPATIENT
Start: 2024-08-27

## 2024-08-27 NOTE — TELEPHONE ENCOUNTER
Pt has had OV within the 12 month protocol and lipid panel is current. 3   month supply sent to pharmacy.   Lab Results   Component Value Date/Time    CHOLSTRLTOT 143 06/20/2024 08:00 AM    LDL 55 06/20/2024 08:00 AM    HDL 76 06/20/2024 08:00 AM    TRIGLYCERIDE 62 06/20/2024 08:00 AM       Lab Results   Component Value Date/Time    SODIUM 140 08/19/2024 08:36 AM    POTASSIUM 4.5 08/19/2024 08:36 AM    CHLORIDE 106 08/19/2024 08:36 AM    CO2 23 08/19/2024 08:36 AM    GLUCOSE 120 (H) 08/19/2024 08:36 AM    BUN 21 08/19/2024 08:36 AM    CREATININE 0.95 08/19/2024 08:36 AM     Lab Results   Component Value Date/Time    ALKPHOSPHAT 100 (H) 08/19/2024 08:36 AM    ASTSGOT 33 08/19/2024 08:36 AM    ALTSGPT 28 08/19/2024 08:36 AM    TBILIRUBIN 1.0 08/19/2024 08:36 AM        Isatu Villafuerte, Clinical Pharmacist, CDE, CACP  Managed Care Pharmacist for [facility]

## 2024-08-28 ENCOUNTER — APPOINTMENT (OUTPATIENT)
Dept: CARDIOLOGY | Facility: MEDICAL CENTER | Age: 69
End: 2024-08-28
Attending: INTERNAL MEDICINE
Payer: MEDICARE

## 2024-09-25 PROCEDURE — RXMED WILLOW AMBULATORY MEDICATION CHARGE: Performed by: INTERNAL MEDICINE

## 2024-09-26 ENCOUNTER — PHARMACY VISIT (OUTPATIENT)
Dept: PHARMACY | Facility: MEDICAL CENTER | Age: 69
End: 2024-09-26
Payer: COMMERCIAL

## 2024-10-08 ENCOUNTER — TELEPHONE (OUTPATIENT)
Dept: HEALTH INFORMATION MANAGEMENT | Facility: OTHER | Age: 69
End: 2024-10-08

## 2024-10-31 ENCOUNTER — HOSPITAL ENCOUNTER (OUTPATIENT)
Dept: LAB | Facility: MEDICAL CENTER | Age: 69
End: 2024-10-31
Attending: FAMILY MEDICINE
Payer: MEDICARE

## 2024-10-31 LAB
ALBUMIN SERPL BCP-MCNC: 4.2 G/DL (ref 3.2–4.9)
ALBUMIN/GLOB SERPL: 1.5 G/DL
ALP SERPL-CCNC: 85 U/L (ref 30–99)
ALT SERPL-CCNC: 34 U/L (ref 2–50)
ANION GAP SERPL CALC-SCNC: 9 MMOL/L (ref 7–16)
APPEARANCE UR: CLEAR
AST SERPL-CCNC: 29 U/L (ref 12–45)
BACTERIA #/AREA URNS HPF: NORMAL /HPF
BASOPHILS # BLD AUTO: 0.5 % (ref 0–1.8)
BASOPHILS # BLD: 0.04 K/UL (ref 0–0.12)
BILIRUB SERPL-MCNC: 0.7 MG/DL (ref 0.1–1.5)
BILIRUB UR QL STRIP.AUTO: NEGATIVE
BUN SERPL-MCNC: 20 MG/DL (ref 8–22)
CALCIUM ALBUM COR SERPL-MCNC: 9.4 MG/DL (ref 8.5–10.5)
CALCIUM SERPL-MCNC: 9.6 MG/DL (ref 8.5–10.5)
CASTS URNS QL MICRO: NORMAL /LPF (ref 0–2)
CHLORIDE SERPL-SCNC: 107 MMOL/L (ref 96–112)
CHOLEST SERPL-MCNC: 160 MG/DL (ref 100–199)
CO2 SERPL-SCNC: 25 MMOL/L (ref 20–33)
COLOR UR: YELLOW
CREAT SERPL-MCNC: 1.03 MG/DL (ref 0.5–1.4)
CRP SERPL HS-MCNC: <0.3 MG/DL (ref 0–0.75)
EOSINOPHIL # BLD AUTO: 0.05 K/UL (ref 0–0.51)
EOSINOPHIL NFR BLD: 0.6 % (ref 0–6.9)
EPITHELIAL CELLS 1715: NORMAL /HPF (ref 0–5)
ERYTHROCYTE [DISTWIDTH] IN BLOOD BY AUTOMATED COUNT: 48.5 FL (ref 35.9–50)
EST. AVERAGE GLUCOSE BLD GHB EST-MCNC: 137 MG/DL
GFR SERPLBLD CREATININE-BSD FMLA CKD-EPI: 79 ML/MIN/1.73 M 2
GLOBULIN SER CALC-MCNC: 2.8 G/DL (ref 1.9–3.5)
GLUCOSE SERPL-MCNC: 94 MG/DL (ref 65–99)
GLUCOSE UR STRIP.AUTO-MCNC: >=1000 MG/DL
HBA1C MFR BLD: 6.4 % (ref 4–5.6)
HCT VFR BLD AUTO: 45.5 % (ref 42–52)
HDLC SERPL-MCNC: 94 MG/DL
HGB BLD-MCNC: 14.4 G/DL (ref 14–18)
IMM GRANULOCYTES # BLD AUTO: 0.03 K/UL (ref 0–0.11)
IMM GRANULOCYTES NFR BLD AUTO: 0.4 % (ref 0–0.9)
KETONES UR STRIP.AUTO-MCNC: NEGATIVE MG/DL
LDLC SERPL CALC-MCNC: 53 MG/DL
LEUKOCYTE ESTERASE UR QL STRIP.AUTO: NEGATIVE
LYMPHOCYTES # BLD AUTO: 1.73 K/UL (ref 1–4.8)
LYMPHOCYTES NFR BLD: 21.8 % (ref 22–41)
MCH RBC QN AUTO: 30.4 PG (ref 27–33)
MCHC RBC AUTO-ENTMCNC: 31.6 G/DL (ref 32.3–36.5)
MCV RBC AUTO: 96 FL (ref 81.4–97.8)
MICRO URNS: ABNORMAL
MONOCYTES # BLD AUTO: 0.66 K/UL (ref 0–0.85)
MONOCYTES NFR BLD AUTO: 8.3 % (ref 0–13.4)
NEUTROPHILS # BLD AUTO: 5.43 K/UL (ref 1.82–7.42)
NEUTROPHILS NFR BLD: 68.4 % (ref 44–72)
NITRITE UR QL STRIP.AUTO: NEGATIVE
NRBC # BLD AUTO: 0 K/UL
NRBC BLD-RTO: 0 /100 WBC (ref 0–0.2)
PH UR STRIP.AUTO: 6 [PH] (ref 5–8)
PLATELET # BLD AUTO: 277 K/UL (ref 164–446)
PMV BLD AUTO: 8.4 FL (ref 9–12.9)
POTASSIUM SERPL-SCNC: 4.4 MMOL/L (ref 3.6–5.5)
PROT SERPL-MCNC: 7 G/DL (ref 6–8.2)
PROT UR QL STRIP: NEGATIVE MG/DL
PSA SERPL-MCNC: 0.74 NG/ML (ref 0–4)
RBC # BLD AUTO: 4.74 M/UL (ref 4.7–6.1)
RBC # URNS HPF: NORMAL /HPF (ref 0–2)
RBC UR QL AUTO: ABNORMAL
SODIUM SERPL-SCNC: 141 MMOL/L (ref 135–145)
SP GR UR STRIP.AUTO: 1.03
TRIGL SERPL-MCNC: 66 MG/DL (ref 0–149)
TSH SERPL-ACNC: 2.78 UIU/ML (ref 0.35–5.5)
URATE SERPL-MCNC: 5.2 MG/DL (ref 2.5–8.3)
UROBILINOGEN UR STRIP.AUTO-MCNC: 0.2 EU/DL
WBC # BLD AUTO: 7.9 K/UL (ref 4.8–10.8)
WBC #/AREA URNS HPF: NORMAL /HPF

## 2024-10-31 PROCEDURE — 81015 MICROSCOPIC EXAM OF URINE: CPT

## 2024-10-31 PROCEDURE — 80061 LIPID PANEL: CPT

## 2024-10-31 PROCEDURE — 86140 C-REACTIVE PROTEIN: CPT

## 2024-10-31 PROCEDURE — 83036 HEMOGLOBIN GLYCOSYLATED A1C: CPT

## 2024-10-31 PROCEDURE — 81003 URINALYSIS AUTO W/O SCOPE: CPT

## 2024-10-31 PROCEDURE — 85025 COMPLETE CBC W/AUTO DIFF WBC: CPT

## 2024-10-31 PROCEDURE — 84550 ASSAY OF BLOOD/URIC ACID: CPT

## 2024-10-31 PROCEDURE — 80053 COMPREHEN METABOLIC PANEL: CPT

## 2024-10-31 PROCEDURE — 84153 ASSAY OF PSA TOTAL: CPT

## 2024-10-31 PROCEDURE — 84443 ASSAY THYROID STIM HORMONE: CPT

## 2024-10-31 PROCEDURE — 36415 COLL VENOUS BLD VENIPUNCTURE: CPT

## 2024-12-10 ENCOUNTER — OFFICE VISIT (OUTPATIENT)
Dept: CARDIOLOGY | Facility: MEDICAL CENTER | Age: 69
End: 2024-12-10
Attending: INTERNAL MEDICINE
Payer: MEDICARE

## 2024-12-10 VITALS
WEIGHT: 127 LBS | DIASTOLIC BLOOD PRESSURE: 86 MMHG | OXYGEN SATURATION: 98 % | HEIGHT: 65 IN | SYSTOLIC BLOOD PRESSURE: 156 MMHG | HEART RATE: 55 BPM | BODY MASS INDEX: 21.16 KG/M2

## 2024-12-10 DIAGNOSIS — I10 ESSENTIAL HYPERTENSION, BENIGN: ICD-10-CM

## 2024-12-10 DIAGNOSIS — I25.10 CORONARY ARTERY DISEASE INVOLVING NATIVE CORONARY ARTERY OF NATIVE HEART WITHOUT ANGINA PECTORIS: ICD-10-CM

## 2024-12-10 DIAGNOSIS — Z95.5 STENTED CORONARY ARTERY: ICD-10-CM

## 2024-12-10 DIAGNOSIS — E78.5 DYSLIPIDEMIA: ICD-10-CM

## 2024-12-10 PROCEDURE — 99214 OFFICE O/P EST MOD 30 MIN: CPT | Performed by: INTERNAL MEDICINE

## 2024-12-10 PROCEDURE — 99213 OFFICE O/P EST LOW 20 MIN: CPT | Performed by: INTERNAL MEDICINE

## 2024-12-10 PROCEDURE — G2211 COMPLEX E/M VISIT ADD ON: HCPCS | Performed by: INTERNAL MEDICINE

## 2024-12-10 PROCEDURE — 3077F SYST BP >= 140 MM HG: CPT | Performed by: INTERNAL MEDICINE

## 2024-12-10 PROCEDURE — 3079F DIAST BP 80-89 MM HG: CPT | Performed by: INTERNAL MEDICINE

## 2024-12-10 RX ORDER — ALLOPURINOL 100 MG/1
200 TABLET ORAL DAILY
COMMUNITY
Start: 2024-10-15

## 2024-12-10 RX ORDER — AMLODIPINE BESYLATE 5 MG/1
5 TABLET ORAL DAILY
Qty: 90 TABLET | Refills: 3 | Status: SHIPPED | OUTPATIENT
Start: 2024-12-10

## 2024-12-10 ASSESSMENT — ENCOUNTER SYMPTOMS
DIZZINESS: 0
COUGH: 0
SHORTNESS OF BREATH: 0
CONSTITUTIONAL NEGATIVE: 1
ABDOMINAL PAIN: 0
CARDIOVASCULAR NEGATIVE: 1
NERVOUS/ANXIOUS: 0
VOMITING: 0
HEADACHES: 0
DEPRESSION: 0
CLAUDICATION: 0
WEAKNESS: 0
EYES NEGATIVE: 1
GASTROINTESTINAL NEGATIVE: 1
WEIGHT LOSS: 0
NAUSEA: 0
BRUISES/BLEEDS EASILY: 0
MYALGIAS: 0
BLURRED VISION: 0
FOCAL WEAKNESS: 0
MUSCULOSKELETAL NEGATIVE: 1
NEUROLOGICAL NEGATIVE: 1
PALPITATIONS: 0
RESPIRATORY NEGATIVE: 1
DOUBLE VISION: 0
PSYCHIATRIC NEGATIVE: 1
FEVER: 0
CHILLS: 0

## 2024-12-10 ASSESSMENT — FIBROSIS 4 INDEX: FIB4 SCORE: 1.22

## 2024-12-10 NOTE — PROGRESS NOTES
Chief Complaint   Patient presents with    Coronary Artery Disease     F/V Dx: Coronary artery disease involving native coronary artery of native heart without angina pectoris    Palpitations    Hypertension       Subjective     Yimi Norwood is a 69 y.o. male who presents today for follow up of coronary artery disease status post stent placement, hypertension and hyperlipidemia.    Since the patient's last visit on 03/22/24, he has been doing well clinically from cardiac standpoint. He denies fatigue, chest pain, shortness of breath, palpitations, lower extremity edema, dizziness or syncope. He underwent cardiac catheterization as below. He has been inactive due to gout.    Past Medical History:   Diagnosis Date    Arthritis     CAD (coronary artery disease)     Cataract     Delayed emergence from general anesthesia     Diabetes (HCC)     Glaucoma     Graves disease     High cholesterol     Hyperlipidemia     Hypertension      Past Surgical History:   Procedure Laterality Date    OTHER ORTHOPEDIC SURGERY      right shoulder surgery     Family History   Problem Relation Age of Onset    Heart Attack Neg Hx     Heart Disease Neg Hx      Social History     Socioeconomic History    Marital status: Single     Spouse name: Not on file    Number of children: Not on file    Years of education: Not on file    Highest education level: Not on file   Occupational History    Not on file   Tobacco Use    Smoking status: Never    Smokeless tobacco: Never   Vaping Use    Vaping status: Never Used   Substance and Sexual Activity    Alcohol use: Not Currently    Drug use: Never    Sexual activity: Not on file   Other Topics Concern    Not on file   Social History Narrative    Not on file     Social Drivers of Health     Financial Resource Strain: Not on file   Food Insecurity: Not on file   Transportation Needs: Not on file   Physical Activity: Not on file   Stress: Not on file   Social Connections: Not on file   Intimate  Partner Violence: Not on file   Housing Stability: Not on file     No Known Allergies    (Medications reviewed.)  Outpatient Encounter Medications as of 12/10/2024   Medication Sig Dispense Refill    allopurinol (ZYLOPRIM) 100 MG Tab Take 200 mg by mouth every day.      atorvastatin (LIPITOR) 80 MG tablet Take 1 Tablet by mouth every evening. (Patient taking differently: Take 40 mg by mouth every evening.) 100 Tablet 0    prasugrel (EFFIENT) 10 MG Tab Take 1 Tablet by mouth every day. 90 Tablet 1    levothyroxine (SYNTHROID) 88 MCG Tab Take 88 mcg by mouth every morning on an empty stomach.      valsartan (DIOVAN) 40 MG Tab Take 0.5 Tablets by mouth every day. 100 Tablet 1    ROCKLATAN 0.02-0.005 % Solution 1 Drop every day. L eye      nebivolol (BYSTOLIC) 10 MG Tab Take 1 Tablet by mouth every day. 100 Tablet 4    aspirin 81 MG EC tablet Take 1 Tablet by mouth every day. 100 Tablet 4    Empagliflozin (JARDIANCE) 10 MG Tab tablet Take 1 Tablet by mouth every day. 100 Tablet 4    dorzolamide-timolol (COSOPT) 22.3-6.8 MG/ML Solution INSTILL 1 DROP INTO LEFT EYE TWICE DAILY       No facility-administered encounter medications on file as of 12/10/2024.     Review of Systems   Constitutional: Negative.  Negative for chills, fever, malaise/fatigue and weight loss.   HENT: Negative.  Negative for hearing loss.    Eyes: Negative.  Negative for blurred vision and double vision.   Respiratory: Negative.  Negative for cough and shortness of breath.    Cardiovascular: Negative.  Negative for chest pain, palpitations, claudication and leg swelling.   Gastrointestinal: Negative.  Negative for abdominal pain, nausea and vomiting.   Genitourinary: Negative.  Negative for dysuria and urgency.   Musculoskeletal: Negative.  Negative for joint pain and myalgias.   Skin: Negative.  Negative for itching and rash.   Neurological: Negative.  Negative for dizziness, focal weakness, weakness and headaches.   Endo/Heme/Allergies: Negative.   "Does not bruise/bleed easily.   Psychiatric/Behavioral: Negative.  Negative for depression. The patient is not nervous/anxious.               Objective     BP (!) 156/86 (BP Location: Left arm, Patient Position: Sitting, BP Cuff Size: Adult)   Pulse (!) 55   Ht 1.651 m (5' 5\")   Wt 57.6 kg (127 lb)   SpO2 98%   BMI 21.13 kg/m²     Physical Exam  Constitutional:       Appearance: Normal appearance. He is well-developed and normal weight.   HENT:      Head: Normocephalic and atraumatic.   Neck:      Vascular: No JVD.   Cardiovascular:      Rate and Rhythm: Normal rate and regular rhythm.      Heart sounds: Normal heart sounds.   Pulmonary:      Effort: Pulmonary effort is normal.      Breath sounds: Normal breath sounds.   Abdominal:      General: Bowel sounds are normal.      Palpations: Abdomen is soft.      Comments: No hepatosplenomegaly.   Musculoskeletal:         General: Normal range of motion.   Lymphadenopathy:      Cervical: No cervical adenopathy.   Skin:     General: Skin is warm and dry.   Neurological:      Mental Status: He is alert and oriented to person, place, and time.            CARDIAC STUDIES/PROCEDURES:    CARDIAC CATHETERIZATION CONCLUSIONS by Dung Phoenix (08/23/24)  99% culprit mid RCA stenosis  50% eccentric LAD nonobstructive coronary artery disease.  LVEDP 18 mmHg  Successful IVUS guided PCI mid RCA (3.5 x 30 mm Synergy Megatron ISABEL), excellent result  (study result reviewed)      CTA OF HEART 3D (03/04/24)   1.  Calcium score of 235.3.  2.  Focal, mixed but predominantly soft plaque in the proximal RCA results in greater than 70% stenosis versus subtotal occlusion. Right dominant circulation. Consider cardiac catheterization.  3.  Mild, predominately calcific plaque in the LAD results in no hemodynamically significant stenosis (less than 50%).  4.  No plaque or stenosis in the LCx.    ECHOCARDIOGRAM CONCLUSIONS (03/11/24)  No prior study is available for comparison.   Normal " left ventricular systolic function.   There is basal to mid inferior wall hypokinesis.  No evidence of valvular abnormality based on Doppler evaluation.   Estimated right ventricular systolic pressure is 25 mmHg.    EKG performed on (08/23/24) was reviewed: EKG personally interpreted shows sinus bradycardia.   EKG performed on (07/06/23) EKG shows sinus rhythm with inferior ST segment depression andT wave abnormalities.      Laboratory results of (10/31/24) were reviewed. Cholesterol profile of 160/66/94/53 mg/dL noted.  Laboratory results of (03/14/24) Cholesterol profile of 193/132/73/94 mg/dL noted.      PET SCAN (07/06/23)  NUCLEAR IMAGING INTERPRETATION  There is decreased perfusion from mid to basal inferoseptal,   inferior and inferolateral wall segments with reversible ischemia.    Extracardiac uptake noted as well.    Artifact versus true ischemia.  Normal left ventricular size, ejection fraction, and wall motion.  Rest EF = 52%  Stress EF = 54%  Upper limit TID = 1.21  Normal total flow reserve = 2.28  ECG INTERPRETATION  Nondiagnostic ECG portion of pharmacological stress study.    Assessment & Plan     No diagnosis found.    Medical Decision Making: Today's Assessment/Status/Plan:        Coronary artery disease with stent placement: He is a 69 year old male with coronary artery disease status post stent placement, hypertension and hyperlipidemia. He is clinically doing well without recurrence of his angina. We will continue with current medical care including aspirin, prasugrel, nebivolol, valsartan, atorvastatin.  Hypertension: Blood pressure has been high. We will start amlodipine, continue with beta blockade therapy and angiotensin receptor blockade and reassess the blood pressure.   Hyperlipidemia: He is doing well on statin therapy without myalgia symptoms.        CC Bobby Wahl

## 2025-01-02 PROCEDURE — RXMED WILLOW AMBULATORY MEDICATION CHARGE: Performed by: INTERNAL MEDICINE

## 2025-01-06 ENCOUNTER — PHARMACY VISIT (OUTPATIENT)
Dept: PHARMACY | Facility: MEDICAL CENTER | Age: 70
End: 2025-01-06
Payer: COMMERCIAL

## 2025-02-11 DIAGNOSIS — R94.39 POSITIVE CARDIAC STRESS TEST: ICD-10-CM

## 2025-02-11 DIAGNOSIS — I20.9 ANGINA PECTORIS, UNSPECIFIED (HCC): ICD-10-CM

## 2025-02-11 RX ORDER — PRASUGREL 10 MG/1
10 TABLET, FILM COATED ORAL DAILY
Qty: 100 TABLET | Refills: 3 | Status: SHIPPED | OUTPATIENT
Start: 2025-02-11

## 2025-02-11 NOTE — TELEPHONE ENCOUNTER
CHELA    Received request via: Patient    Was the patient seen in the last year in this department? Yes    Does the patient have an active prescription (recently filled or refills available) for medication(s) requested? Yes. Refill request has been refused in [facility]. Contacted pharmacy and called in most recent prescription.    Pharmacy Name: [id number] - [place], [place] - [address] [[id number]]     Does the patient have [facility] Plus and need 100-day supply? (This applies to ALL medications) Yes, quantity updated to 100 days     Thank you,  Nany VEGA

## 2025-02-11 NOTE — TELEPHONE ENCOUNTER
Is the patient due for a refill? Yes    Was the patient seen the last 15 months? Yes    Date of last office visit: 12.10.2024    Does the patient have an upcoming appointment?  No   If yes, When?     Provider to refill:CHELA    Does the patient have [facility] Plus and need 100-day supply? (This applies to ALL medications) Yes, quantity updated to 100 days

## 2025-03-05 ENCOUNTER — HOSPITAL ENCOUNTER (OUTPATIENT)
Dept: LAB | Facility: MEDICAL CENTER | Age: 70
End: 2025-03-05
Attending: FAMILY MEDICINE
Payer: MEDICARE

## 2025-03-05 ENCOUNTER — HOSPITAL ENCOUNTER (OUTPATIENT)
Dept: LAB | Facility: MEDICAL CENTER | Age: 70
End: 2025-03-05
Attending: INTERNAL MEDICINE
Payer: MEDICARE

## 2025-03-05 LAB
APPEARANCE UR: ABNORMAL
BACTERIA #/AREA URNS HPF: NORMAL /HPF
BILIRUB UR QL STRIP.AUTO: NEGATIVE
CASTS URNS QL MICRO: NORMAL /LPF (ref 0–2)
CHOLEST SERPL-MCNC: 146 MG/DL (ref 100–199)
COLOR UR: YELLOW
EPITHELIAL CELLS 1715: NORMAL /HPF (ref 0–5)
EST. AVERAGE GLUCOSE BLD GHB EST-MCNC: 131 MG/DL
GLUCOSE UR STRIP.AUTO-MCNC: >=1000 MG/DL
HBA1C MFR BLD: 6.2 % (ref 4–5.6)
HDLC SERPL-MCNC: 74 MG/DL
KETONES UR STRIP.AUTO-MCNC: ABNORMAL MG/DL
LDLC SERPL CALC-MCNC: 59 MG/DL
LEUKOCYTE ESTERASE UR QL STRIP.AUTO: NEGATIVE
MICRO URNS: ABNORMAL
NITRITE UR QL STRIP.AUTO: NEGATIVE
PH UR STRIP.AUTO: 5.5 [PH] (ref 5–8)
PROT UR QL STRIP: NEGATIVE MG/DL
RBC # URNS HPF: NORMAL /HPF (ref 0–2)
RBC UR QL AUTO: ABNORMAL
SP GR UR STRIP.AUTO: 1.02
TRIGL SERPL-MCNC: 67 MG/DL (ref 0–149)
URATE SERPL-MCNC: 4.4 MG/DL (ref 2.5–8.3)
UROBILINOGEN UR STRIP.AUTO-MCNC: 0.2 EU/DL
WBC #/AREA URNS HPF: NORMAL /HPF

## 2025-03-05 PROCEDURE — 84550 ASSAY OF BLOOD/URIC ACID: CPT

## 2025-03-05 PROCEDURE — 80061 LIPID PANEL: CPT

## 2025-03-05 PROCEDURE — 81001 URINALYSIS AUTO W/SCOPE: CPT

## 2025-03-05 PROCEDURE — 36415 COLL VENOUS BLD VENIPUNCTURE: CPT

## 2025-03-05 PROCEDURE — 83036 HEMOGLOBIN GLYCOSYLATED A1C: CPT

## 2025-03-14 ENCOUNTER — TELEPHONE (OUTPATIENT)
Dept: CARDIOLOGY | Facility: MEDICAL CENTER | Age: 70
End: 2025-03-14
Payer: MEDICARE

## 2025-03-17 DIAGNOSIS — I25.10 CORONARY ARTERY DISEASE INVOLVING NATIVE CORONARY ARTERY OF NATIVE HEART WITHOUT ANGINA PECTORIS: ICD-10-CM

## 2025-03-17 RX ORDER — NEBIVOLOL 10 MG/1
10 TABLET ORAL DAILY
Qty: 100 TABLET | Refills: 2 | Status: SHIPPED | OUTPATIENT
Start: 2025-03-17

## 2025-03-17 NOTE — TELEPHONE ENCOUNTER
Is the patient due for a refill? Yes    Was the patient seen the last 15 months? Yes    Date of last office visit: 12/10/2024    Does the patient have an upcoming appointment?  No     Provider to refill:CHELA    Does the patient have [facility] Plus and need 100-day supply? (This applies to ALL medications) Yes, quantity updated to 100 days

## 2025-03-20 ENCOUNTER — PATIENT MESSAGE (OUTPATIENT)
Dept: HEALTH INFORMATION MANAGEMENT | Facility: OTHER | Age: 70
End: 2025-03-20

## 2025-04-15 DIAGNOSIS — I25.10 CORONARY ARTERY DISEASE INVOLVING NATIVE CORONARY ARTERY OF NATIVE HEART WITHOUT ANGINA PECTORIS: ICD-10-CM

## 2025-04-15 DIAGNOSIS — E11.65 TYPE 2 DIABETES MELLITUS WITH HYPERGLYCEMIA, WITHOUT LONG-TERM CURRENT USE OF INSULIN (HCC): ICD-10-CM

## 2025-04-15 PROCEDURE — RXMED WILLOW AMBULATORY MEDICATION CHARGE: Performed by: INTERNAL MEDICINE

## 2025-04-15 RX ORDER — EMPAGLIFLOZIN 10 MG/1
10 TABLET, FILM COATED ORAL DAILY
Qty: 100 TABLET | Refills: 2 | Status: SHIPPED | OUTPATIENT
Start: 2025-04-15

## 2025-04-15 NOTE — TELEPHONE ENCOUNTER
Is the patient due for a refill? Yes    Was the patient seen the last 15 months? Yes    Date of last office visit: 12/10/2024    Does the patient have an upcoming appointment?  No    Provider to refill:CHELA    Does the patient have [facility] and need 100-day supply? (This applies to ALL medications) Yes, quantity updated to 100 days       **Please send to [facility]**

## 2025-04-18 ENCOUNTER — PHARMACY VISIT (OUTPATIENT)
Dept: PHARMACY | Facility: MEDICAL CENTER | Age: 70
End: 2025-04-18
Payer: COMMERCIAL

## 2025-06-18 ENCOUNTER — OFFICE VISIT (OUTPATIENT)
Dept: MEDICAL GROUP | Facility: LAB | Age: 70
End: 2025-06-18
Payer: MEDICARE

## 2025-06-18 VITALS
SYSTOLIC BLOOD PRESSURE: 126 MMHG | HEIGHT: 65 IN | DIASTOLIC BLOOD PRESSURE: 78 MMHG | WEIGHT: 127.21 LBS | BODY MASS INDEX: 21.19 KG/M2 | RESPIRATION RATE: 18 BRPM | HEART RATE: 59 BPM | TEMPERATURE: 98.3 F | OXYGEN SATURATION: 97 %

## 2025-06-18 DIAGNOSIS — Z00.00 ENCOUNTER FOR PREVENTIVE HEALTH EXAMINATION: Primary | ICD-10-CM

## 2025-06-18 DIAGNOSIS — E11.65 TYPE 2 DIABETES MELLITUS WITH HYPERGLYCEMIA, WITHOUT LONG-TERM CURRENT USE OF INSULIN (HCC): ICD-10-CM

## 2025-06-18 PROCEDURE — 99204 OFFICE O/P NEW MOD 45 MIN: CPT | Performed by: PHYSICIAN ASSISTANT

## 2025-06-18 PROCEDURE — 3078F DIAST BP <80 MM HG: CPT | Performed by: PHYSICIAN ASSISTANT

## 2025-06-18 PROCEDURE — 3074F SYST BP LT 130 MM HG: CPT | Performed by: PHYSICIAN ASSISTANT

## 2025-06-18 RX ORDER — COLCHICINE 0.6 MG/1
0.6 TABLET ORAL 2 TIMES DAILY PRN
COMMUNITY

## 2025-06-18 RX ORDER — METFORMIN HYDROCHLORIDE 500 MG/1
500 TABLET, EXTENDED RELEASE ORAL DAILY
Qty: 100 TABLET | Refills: 3 | Status: SHIPPED | OUTPATIENT
Start: 2025-06-18 | End: 2026-07-23

## 2025-06-18 ASSESSMENT — PATIENT HEALTH QUESTIONNAIRE - PHQ9: CLINICAL INTERPRETATION OF PHQ2 SCORE: 0

## 2025-06-18 ASSESSMENT — FIBROSIS 4 INDEX: FIB4 SCORE: 1.22

## 2025-06-18 NOTE — ASSESSMENT & PLAN NOTE
Patient's hemoglobin A1c is 6.2 on 3/5/2025.  Patient has been on Jardiance, and denies any adverse side effects of this medication.  I recommend adding  metformin 500 mg daily to his current medication regimen.  - I will follow-up with him in 3 months for repeat hemoglobin A1c.    Orders:    metFORMIN ER (GLUCOPHAGE XR) 500 MG TABLET SR 24 HR; Take 1 Tablet by mouth every day.

## 2025-06-18 NOTE — PROGRESS NOTES
Subjective  Yimi Norwood is a 69 y.o. male who presents today to [facility] care.     He has coronary artery disease status post stent placement, hypertension and hyperlipidemia.     Type 2 diabetes  Patient has been on Jardiance for type 2 diabetes, denies any adverse side effects.  Metformin was previously discussed by his primary care provider; however, this was never started.  Patient is open to discussing medication options to have better control over his hemoglobin A1c.    Health maintenance  Oral hygiene: Dental cleanings every 6 months, and sees a dentist every year  [facility] care[facility]: Well-groomed, does not report any recent adverse events  Medications:  Gout from prasugrel - couldn't get a shoe on. Allopurenol 300mg daily for prevention has addressed his concerns and he has also made dietary modifications to reduce gout flares.  Sleep: Sleeping well, did not report trouble falling asleep or staying asleep  Diet/exercise: Balanced diet, exercises regularly.  BM: Daily, soft.  Not straining  : Denied urgency, frequency, nocturia, intermittency, hesitancy, weak urinary stream, dysuria, and hematuria      Family History   Problem Relation Age of Onset    Heart Attack Neg Hx     Heart Disease Neg Hx        Social History     Socioeconomic History    Marital status: Single     Spouse name: Not on file    Number of children: Not on file    Years of education: Not on file    Highest education level: Not on file   Occupational History    Not on file   Tobacco Use    Smoking status: Never    Smokeless tobacco: Never   Vaping Use    Vaping status: Never Used   Substance and Sexual Activity    Alcohol use: Not Currently    Drug use: Never    Sexual activity: Not on file   Other Topics Concern    Not on file   Social History Narrative    Not on file     Social Drivers of Health     Financial Resource Strain: Not on file   Food Insecurity: Not on file   Transportation Needs: Not on  "file   Physical Activity: Not on file   Stress: Not on file   Social Connections: Not on file   Intimate Partner Violence: Not on file   Housing Stability: Not on file       Past Surgical History[1]    Past Medical History[2]    Current Medications[3]    Allergies[4]    Objective  /78 (BP Location: Left arm, Patient Position: Sitting, BP Cuff Size: Adult)   Pulse (!) 59   Temp 36.8 °C (98.3 °F) (Temporal)   Resp 18   Ht 1.651 m (5' 5\")   Wt 57.7 kg (127 lb 3.3 oz)   SpO2 97%   Physical Exam  Constitutional:       Appearance: Normal appearance.   HENT:      Right Ear: Tympanic membrane, ear canal and external ear normal. There is no impacted cerumen.      Left Ear: Tympanic membrane, ear canal and external ear normal. There is no impacted cerumen.      Mouth/Throat:      Mouth: Mucous membranes are moist.   Eyes:      Extraocular Movements: Extraocular movements intact.      Pupils: Pupils are equal, round, and reactive to light.   Cardiovascular:      Rate and Rhythm: Normal rate and regular rhythm.   Pulmonary:      Effort: Pulmonary effort is normal.      Breath sounds: Normal breath sounds.   Musculoskeletal:      Cervical back: Normal range of motion.      Right lower leg: No edema.      Left lower leg: No edema.   Skin:     General: Skin is warm and dry.   Neurological:      Mental Status: He is alert.   Psychiatric:         Mood and Affect: Mood normal.         Behavior: Behavior normal.         Thought Content: Thought content normal.         Labs:   Component      Latest Ref Rng 3/5/2025   Cholesterol,Tot      100 - 199 mg/dL 146    Triglycerides      0 - 149 mg/dL 67    HDL      >=40 mg/dL 74    LDL      <100 mg/dL 59    Glycohemoglobin      4.0 - 5.6 % 6.2 (H)    Estim. Avg Glu      mg/dL 131       HEMOGLOBIN A1C  Order: [id number]   Status: Final result       Next appt: 12/10/2025 at 10:00 AM in Cardiology ([place] M.D.)    Test Result Released: Yes (seen)    0 Result Notes       1 HM " Topic            Component  Ref Range & Units (hover) 3 mo ago  (3/5/25) 7 mo ago  (10/31/24) 1 yr ago  (3/14/24) 1 yr ago  (6/30/23) 4 yr ago  (4/22/21) 4 yr ago  (1/7/21) 9 yr ago  (10/12/15)   Glycohemoglobin 6.2 High  6.4 High  CM 5.8 High  CM 5.9 High  CM 6.6 High  CM 6.5 High  R, CM 6.0 High  R, CM   Comment: Increased risk for diabetes:  5.7 -6.4%  Diabetes:  >6.4%  Glycemic control for adults with diabetes:  <7.0%    The above interpretations are per ADA guidelines.  Diagnosis  of diabetes mellitus on the basis of elevated Hemoglobin A1c  should be confirmed by repeating the Hb A1c test.   Est Avg Glucose 131 137      CM   Comment: The eAG calculation is based on the A1c-Derived Daily Glucose  (ADAG) study.  See the ADA's website for additional information.   Resulting Agency M M M M M M M             Specimen Collected: 03/05/25  6:50 AM Last Resulted: 03/05/25  5:44 PM           Assessment & Plan  Encounter for preventive health examination  Labs reviewed today: Lipid panel and hemoglobin A1c    We discussed recommendations focusing on healthy lifestyle choices such as weight management, regular exercise, and healthy diet.  Patient was encouraged to gravitate towards whole, unprocessed foods, this means choosing fruits, vegetables, lean proteins, and whole grains.  Limit or avoid sugary drinks, processed foods, and refined carbohydrates.           Type 2 diabetes mellitus with hyperglycemia, without long-term current use of insulin (HCC)  Patient's hemoglobin A1c is 6.2 on 3/5/2025.  Patient has been on Jardiance, and denies any adverse side effects of this medication.  I recommend adding  metformin 500 mg daily to his current medication regimen.  - I will follow-up with him in 3 months for repeat hemoglobin A1c.    Orders:    metFORMIN ER (GLUCOPHAGE XR) 500 MG TABLET SR 24 HR; Take 1 Tablet by mouth every day.          1. Encounter for preventive health examination    2.  Type 2 diabetes mellitus with hyperglycemia, without long-term current use of insulin (HCC)  - metFORMIN ER (GLUCOPHAGE XR) 500 MG TABLET SR 24 HR; Take 1 Tablet by mouth every day.  Dispense: 100 Tablet; Refill: 3    Other orders  - colchicine (COLCRYS) 0.6 MG Tab; Take 0.6 mg by mouth 2 times a day as needed.    -RTC in 3 months for diabetes check       [1]   Past Surgical History:  Procedure Laterality Date    OTHER ORTHOPEDIC SURGERY      right shoulder surgery   [2]   Past Medical History:  Diagnosis Date    Arthritis     CAD (coronary artery disease)     Cataract     Delayed emergence from general anesthesia     Diabetes (HCC)     Glaucoma     Graves disease     High cholesterol     Hyperlipidemia     Hypertension    [3]   Current Outpatient Medications   Medication Sig Dispense Refill    colchicine (COLCRYS) 0.6 MG Tab Take 0.6 mg by mouth 2 times a day as needed.      metFORMIN ER (GLUCOPHAGE XR) 500 MG TABLET SR 24 HR Take 1 Tablet by mouth every day. 100 Tablet 3    Empagliflozin (JARDIANCE) 10 MG Tab tablet Take 1 Tablet by mouth every day. 100 Tablet 2    nebivolol (BYSTOLIC) 10 MG Tab Take 1 tablet by mouth once daily 100 Tablet 2    prasugrel (EFFIENT) 10 MG Tab Take 1 Tablet by mouth every day. 100 Tablet 3    allopurinol (ZYLOPRIM) 100 MG Tab Take 200 mg by mouth every day.      amLODIPine (NORVASC) 5 MG Tab Take 1 Tablet by mouth every day. 90 Tablet 3    levothyroxine (SYNTHROID) 88 MCG Tab Take 88 mcg by mouth every morning on an empty stomach.      valsartan (DIOVAN) 40 MG Tab Take 0.5 Tablets by mouth every day. 100 Tablet 1    ROCKLATAN 0.02-0.005 % Solution 1 Drop every day. L eye      aspirin 81 MG EC tablet Take 1 Tablet by mouth every day. 100 Tablet 4    dorzolamide-timolol (COSOPT) 22.3-6.8 MG/ML Solution INSTILL 1 DROP INTO LEFT EYE TWICE DAILY      atorvastatin (LIPITOR) 80 MG tablet Take 1 Tablet by mouth every evening. (Patient taking differently: Take 40 mg by mouth every evening.)  100 Tablet 0     No current facility-administered medications for this visit.   [4] No Known Allergies

## 2025-06-18 NOTE — ASSESSMENT & PLAN NOTE
Labs reviewed today: Lipid panel and hemoglobin A1c    We discussed recommendations focusing on healthy lifestyle choices such as weight management, regular exercise, and healthy diet.  Patient was encouraged to gravitate towards whole, unprocessed foods, this means choosing fruits, vegetables, lean proteins, and whole grains.  Limit or avoid sugary drinks, processed foods, and refined carbohydrates.

## 2025-07-15 DIAGNOSIS — M10.9 GOUT, UNSPECIFIED CAUSE, UNSPECIFIED CHRONICITY, UNSPECIFIED SITE: Primary | ICD-10-CM

## 2025-07-15 RX ORDER — ALLOPURINOL 100 MG/1
300 TABLET ORAL DAILY
Qty: 90 TABLET | Refills: 0 | Status: SHIPPED | OUTPATIENT
Start: 2025-07-15

## 2025-07-15 NOTE — PROGRESS NOTES
Received request via: Patient    Was the patient seen in the last year in this department? Yes    Does the patient have an active prescription (recently filled or refills available) for medication(s) requested? No    Pharmacy Name: [facility] [place]    Does the patient have [facility] Plus and need 100-day supply? (This applies to ALL medications) Patient does not have S[facility]

## 2025-07-25 PROCEDURE — RXMED WILLOW AMBULATORY MEDICATION CHARGE: Performed by: INTERNAL MEDICINE

## 2025-07-29 ENCOUNTER — PHARMACY VISIT (OUTPATIENT)
Dept: PHARMACY | Facility: MEDICAL CENTER | Age: 70
End: 2025-07-29
Payer: COMMERCIAL

## 2025-07-30 ENCOUNTER — DOCUMENTATION (OUTPATIENT)
Dept: HEALTH INFORMATION MANAGEMENT | Facility: OTHER | Age: 70
End: 2025-07-30
Payer: MEDICARE